# Patient Record
Sex: MALE | Race: WHITE | HISPANIC OR LATINO | Employment: UNEMPLOYED | URBAN - METROPOLITAN AREA
[De-identification: names, ages, dates, MRNs, and addresses within clinical notes are randomized per-mention and may not be internally consistent; named-entity substitution may affect disease eponyms.]

---

## 2017-05-01 ENCOUNTER — PRE VISIT (OUTPATIENT)
Dept: UROLOGY | Facility: CLINIC | Age: 77
End: 2017-05-01

## 2017-06-02 ENCOUNTER — MEDICAL CORRESPONDENCE (OUTPATIENT)
Dept: HEALTH INFORMATION MANAGEMENT | Facility: CLINIC | Age: 77
End: 2017-06-02

## 2017-06-15 ENCOUNTER — TELEPHONE (OUTPATIENT)
Dept: GASTROENTEROLOGY | Facility: OUTPATIENT CENTER | Age: 77
End: 2017-06-15

## 2017-06-21 ENCOUNTER — DOCUMENTATION ONLY (OUTPATIENT)
Dept: GASTROENTEROLOGY | Facility: OUTPATIENT CENTER | Age: 77
End: 2017-06-21

## 2017-07-06 ENCOUNTER — TELEPHONE (OUTPATIENT)
Dept: GASTROENTEROLOGY | Facility: CLINIC | Age: 77
End: 2017-07-06

## 2017-07-17 DIAGNOSIS — R39.15 URINARY URGENCY: ICD-10-CM

## 2017-07-17 DIAGNOSIS — R39.9 LOWER URINARY TRACT SYMPTOMS (LUTS): ICD-10-CM

## 2017-07-17 RX ORDER — FINASTERIDE 5 MG/1
5 TABLET, FILM COATED ORAL DAILY
Qty: 90 TABLET | Refills: 0 | Status: SHIPPED | OUTPATIENT
Start: 2017-07-17 | End: 2019-07-11 | Stop reason: ALTCHOICE

## 2017-07-28 ENCOUNTER — TELEPHONE (OUTPATIENT)
Dept: GASTROENTEROLOGY | Facility: CLINIC | Age: 77
End: 2017-07-28

## 2017-07-31 ENCOUNTER — TELEPHONE (OUTPATIENT)
Dept: GASTROENTEROLOGY | Facility: CLINIC | Age: 77
End: 2017-07-31

## 2019-03-29 ENCOUNTER — TRANSFERRED RECORDS (OUTPATIENT)
Dept: HEALTH INFORMATION MANAGEMENT | Facility: CLINIC | Age: 79
End: 2019-03-29

## 2019-04-01 ENCOUNTER — PRE VISIT (OUTPATIENT)
Dept: UROLOGY | Facility: CLINIC | Age: 79
End: 2019-04-01

## 2019-04-01 NOTE — TELEPHONE ENCOUNTER
MEDICAL RECORDS REQUEST   [facility]  Urology Clinic  [address] [place] [address]  PHONE: [phone]  Fax: [phone]        FUTURE VISIT INFORMATION                                                   Brian Mehta, : 1940 scheduled for future visit at [facility]    APPOINTMENT INFORMATION:    Date: 19 6PM     Provider:  LARRY CAMPA    Reason for Visit/Diagnosis: BURNING WITH URINATION    REFERRAL INFORMATION:    Referring provider:  SELF    Specialty: SELF    Referring providers clinic:  SELF    Clinic contact number:  SELF    RECORDS REQUESTED FOR VISIT                                                     NOTES  STATUS/DETAILS   OFFICE NOTE from referring provider  no   OFFICE NOTE from other specialist  yes   DISCHARGE SUMMARY from hospital  no   DISCHARGE REPORT from the ER  no   OPERATIVE REPORT  no   MEDICATION LIST  yes       PRE-VISIT CHECKLIST      Record collection complete Yes   Appointment appropriately scheduled           (right time/right provider) Yes   MyChart activation If no, please explain IN PROCESS   Questionnaire complete  If no, please explain IN PROCESS     Completed by: Candi Verma

## 2019-04-03 ENCOUNTER — PRE VISIT (OUTPATIENT)
Dept: UROLOGY | Facility: CLINIC | Age: 79
End: 2019-04-03

## 2019-05-29 ENCOUNTER — PRE VISIT (OUTPATIENT)
Dept: UROLOGY | Facility: CLINIC | Age: 79
End: 2019-05-29

## 2019-06-04 ENCOUNTER — OFFICE VISIT (OUTPATIENT)
Dept: UROLOGY | Facility: CLINIC | Age: 79
End: 2019-06-04
Payer: MEDICARE

## 2019-06-04 VITALS
HEIGHT: 70 IN | DIASTOLIC BLOOD PRESSURE: 91 MMHG | BODY MASS INDEX: 28.63 KG/M2 | WEIGHT: 200 LBS | SYSTOLIC BLOOD PRESSURE: 135 MMHG | HEART RATE: 100 BPM

## 2019-06-04 DIAGNOSIS — R35.0 BENIGN PROSTATIC HYPERPLASIA WITH URINARY FREQUENCY: Primary | ICD-10-CM

## 2019-06-04 DIAGNOSIS — N40.1 BENIGN PROSTATIC HYPERPLASIA WITH URINARY FREQUENCY: Primary | ICD-10-CM

## 2019-06-04 ASSESSMENT — MIFFLIN-ST. JEOR: SCORE: 1638.44

## 2019-06-04 ASSESSMENT — PAIN SCALES - GENERAL: PAINLEVEL: NO PAIN (0)

## 2019-06-04 NOTE — LETTER
"6/4/2019       RE: Brian Mehta  [address]  [facility] [address]     Dear Colleague,    Thank you for referring your patient, Brian Mehta, to the [facility] UROLOGY AND [facility] FOR PROSTATE AND UROLOGIC CANCERS at [facility]. Please see a copy of my visit note below.            Chief Complaint:   BPH with LUTS         History of Present Illness:    Brian Mehta is a very pleasant 79 year old male with a history of BPH with LUTS (urinary urgency, frequency, nocturia) who presents for evaluation of these symptoms. Per chart review, he was last seen by Dayanara Cosme PA-C in the urology clinic in 06/2016. At that time, he had been taking finasteride and tamsulosin for his symptoms, but admitted to not always taking the Flomax consistently. His last PSA, drawn 1/18/16, was 1.37.    Today, he reports that currently, he is having to urinate every hour at night, and every 2-3 hours during the day. His nocturia is the most bothersome symptom. He describes his stream as \"mediocre.\" He usually doesn't feel like he is retaining urine when he urinates. He states that he stopped taking his tamsulosin and finasteride \"a while ago,\" because he felt that they were no effective at controlling his symptoms. However, his nephew who is present today, states that he is, in fact, taking the tamsulosin, but forgets doses at times.            Past Medical History:     Past Medical History:   Diagnosis Date     Diabetes mellitus (H)             Past Surgical History:   History reviewed. No pertinent surgical history.         Medications     Current Outpatient Medications   Medication     tamsulosin (FLOMAX) 0.4 MG 24 hr capsule     UNKNOWN TO PATIENT     finasteride (PROSCAR) 5 MG tablet     METFORMIN HCL     oxybutynin (DITROPAN) 5 MG tablet     No current facility-administered medications for this visit.             Family History:   History reviewed. No pertinent family history.         " "Social History:     Social History     Socioeconomic History     Marital status:      Spouse name: Not on file     Number of children: Not on file     Years of education: Not on file     Highest education level: Not on file   Occupational History     Not on file   Social Needs     Financial resource strain: Not on file     Food insecurity:     Worry: Not on file     Inability: Not on file     Transportation needs:     Medical: Not on file     Non-medical: Not on file   Tobacco Use     Smoking status: Never Smoker     Smokeless tobacco: Never Used   Substance and Sexual Activity     Alcohol use: Not Currently     Drug use: Not on file     Sexual activity: Not Currently   Lifestyle     Physical activity:     Days per week: Not on file     Minutes per session: Not on file     Stress: Not on file   Relationships     Social connections:     Talks on phone: Not on file     Gets together: Not on file     Attends [employer] service: Not on file     Active member of club or organization: Not on file     Attends meetings of clubs or organizations: Not on file     Relationship status: Not on file     Intimate partner violence:     Fear of current or ex partner: Not on file     Emotionally abused: Not on file     Physically abused: Not on file     Forced sexual activity: Not on file   Other Topics Concern     Parent/sibling w/ CABG, MI or angioplasty before 65F 55M? Not Asked   Social History Narrative     Not on file            Allergies:   Patient has no known allergies.         Review of Systems:  From intake questionnaire   Negative 14 system review except as noted on HPI, nurse's note.         Physical Exam:   Patient is a 79 year old  male   Vitals: Blood pressure (!) 135/91, pulse 100, height 1.778 m (5' 10\"), weight 90.7 kg (200 lb).  General Appearance Adult: Alert, no acute distress, oriented  Lungs: no respiratory distress, or pursed lip breathing  Heart: No obvious jugular venous distension present  Abdomen: " soft, nontender, no organomegaly or masses, Body mass index is 28.7 kg/m .  Musculoskeltal: extremities normal, no peripheral edema  Skin: no suspicious lesions or rashes  Neuro: Alert, oriented, speech and mentation normal  : deferred     Uroflow and Post-Void Residual by Bladder Scan     PVR: 100 mL      Labs and Pathology:    I personally reviewed all applicable laboratory data and went over findings with patient  Significant for:    PSA RESULTS  PSA   Date Value Ref Range Status   01/18/2016 1.37 0 - 4 ug/L Final   11/11/2005 1.31 0 - 4 ug/L Final         Imaging:    I personally reviewed all applicable imaging            Assessment and Plan:     Assessment: 79 year old male with a history of BPH with LUTS, who has tried medication in the past, but did not feel that they were effective in controlling his symptoms. He continues to take tamsulosin, although intermittently.     Plan:  -Schedule cystoscopy with Dr. Coronado at next available to evaluate degree of outlet obstruction and candidacy for procedural intervention.       Tona Andre, CNP  Department of Urology

## 2019-06-04 NOTE — PROGRESS NOTES
"        Chief Complaint:   BPH with LUTS         History of Present Illness:    Brian Mehta is a very pleasant 79 year old male with a history of BPH with LUTS (urinary urgency, frequency, nocturia) who presents for evaluation of these symptoms. Per chart review, he was last seen by Dayanara Cosme PA-C in the urology clinic in 06/2016. At that time, he had been taking finasteride and tamsulosin for his symptoms, but admitted to not always taking the Flomax consistently. His last PSA, drawn 1/18/16, was 1.37.    Today, he reports that currently, he is having to urinate every hour at night, and every 2-3 hours during the day. His nocturia is the most bothersome symptom. He describes his stream as \"mediocre.\" He usually doesn't feel like he is retaining urine when he urinates. He states that he stopped taking his tamsulosin and finasteride \"a while ago,\" because he felt that they were no effective at controlling his symptoms. However, his nephew who is present today, states that he is, in fact, taking the tamsulosin, but forgets doses at times.            Past Medical History:     Past Medical History:   Diagnosis Date     Diabetes mellitus (H)             Past Surgical History:   History reviewed. No pertinent surgical history.         Medications     Current Outpatient Medications   Medication     tamsulosin (FLOMAX) 0.4 MG 24 hr capsule     UNKNOWN TO PATIENT     finasteride (PROSCAR) 5 MG tablet     METFORMIN HCL     oxybutynin (DITROPAN) 5 MG tablet     No current facility-administered medications for this visit.             Family History:   History reviewed. No pertinent family history.         Social History:     Social History     Socioeconomic History     Marital status:      Spouse name: Not on file     Number of children: Not on file     Years of education: Not on file     Highest education level: Not on file   Occupational History     Not on file   Social Needs     Financial resource strain: Not " "on file     Food insecurity:     Worry: Not on file     Inability: Not on file     Transportation needs:     Medical: Not on file     Non-medical: Not on file   Tobacco Use     Smoking status: Never Smoker     Smokeless tobacco: Never Used   Substance and Sexual Activity     Alcohol use: Not Currently     Drug use: Not on file     Sexual activity: Not Currently   Lifestyle     Physical activity:     Days per week: Not on file     Minutes per session: Not on file     Stress: Not on file   Relationships     Social connections:     Talks on phone: Not on file     Gets together: Not on file     Attends Pentec[employer]al service: Not on file     Active member of club or organization: Not on file     Attends meetings of clubs or organizations: Not on file     Relationship status: Not on file     Intimate partner violence:     Fear of current or ex partner: Not on file     Emotionally abused: Not on file     Physically abused: Not on file     Forced sexual activity: Not on file   Other Topics Concern     Parent/sibling w/ CABG, MI or angioplasty before 65F 55M? Not Asked   Social History Narrative     Not on file            Allergies:   Patient has no known allergies.         Review of Systems:  From intake questionnaire   Negative 14 system review except as noted on HPI, nurse's note.         Physical Exam:   Patient is a 79 year old  male   Vitals: Blood pressure (!) 135/91, pulse 100, height 1.778 m (5' 10\"), weight 90.7 kg (200 lb).  General Appearance Adult: Alert, no acute distress, oriented  Lungs: no respiratory distress, or pursed lip breathing  Heart: No obvious jugular venous distension present  Abdomen: soft, nontender, no organomegaly or masses, Body mass index is 28.7 kg/m .  Musculoskeltal: extremities normal, no peripheral edema  Skin: no suspicious lesions or rashes  Neuro: Alert, oriented, speech and mentation normal  : deferred     Uroflow and Post-Void Residual by Bladder Scan     PVR: 100 mL      Labs and " Pathology:    I personally reviewed all applicable laboratory data and went over findings with patient  Significant for:    PSA RESULTS  PSA   Date Value Ref Range Status   01/18/2016 1.37 0 - 4 ug/L Final   11/11/2005 1.31 0 - 4 ug/L Final         Imaging:    I personally reviewed all applicable imaging            Assessment and Plan:     Assessment: 79 year old male with a history of BPH with LUTS, who has tried medication in the past, but did not feel that they were effective in controlling his symptoms. He continues to take tamsulosin, although intermittently.     Plan:  -Schedule cystoscopy with Dr. Coronado at next available to evaluate degree of outlet obstruction and candidacy for procedural intervention.       Tona Andre, CNP  Department of Urology

## 2019-06-04 NOTE — NURSING NOTE
"New-Increased Frq, starting about 1 year ago.  Increased Frq, he reports 5-6 times per night associated with increased urgency.  He denies other urinary sx and pains, he is taking flowmax at night.      Chief Complaint   Patient presents with     New Patient     Increased Frq       Blood pressure (!) 135/91, pulse 100, height 1.778 m (5' 10\"), weight 90.7 kg (200 lb). Body mass index is 28.7 kg/m .    Patient Active Problem List   Diagnosis     Microscopic hematuria     Memory loss       No Known Allergies    Current Outpatient Medications   Medication Sig Dispense Refill     tamsulosin (FLOMAX) 0.4 MG 24 hr capsule Take 1 capsule (0.4 mg) by mouth daily 90 capsule 3     UNKNOWN TO PATIENT Blood pressure pills       finasteride (PROSCAR) 5 MG tablet Take 1 tablet (5 mg) by mouth daily (Patient not taking: Reported on 6/4/2019) 90 tablet 0     METFORMIN HCL        oxybutynin (DITROPAN) 5 MG tablet 5 mg take 1 tablet at nighttime (Patient not taking: Reported on 6/4/2019) 30 tablet 3       Social History     Tobacco Use     Smoking status: Never Smoker     Smokeless tobacco: Never Used   Substance Use Topics     Alcohol use: Not Currently     Drug use: None       Antony Montgomery, EMT  6/4/2019  4:55 PM        "

## 2019-06-04 NOTE — PATIENT INSTRUCTIONS
UROLOGY CLINIC VISIT PATIENT INSTRUCTIONS    1) Schedule a cystoscopy with Dr. Coronado at the next available to evaluate your prostate size, level of obstruction, and candidacy for procedural intervention.     If you have any issues, questions or concerns in the meantime, do not hesitate to contact us at [phone] or via [employer].     It was a pleasure meeting with you today.  Thank you for allowing me and my team the privilege of caring for you today.  YOU are the reason we are here, and I truly hope we provided you with the excellent service you deserve.  Please let us know if there is anything else we can do for you so that we can be sure you are leaving completely satisfied with your care experience.    Tona Andre, CNP

## 2019-06-28 ENCOUNTER — PRE VISIT (OUTPATIENT)
Dept: UROLOGY | Facility: CLINIC | Age: 79
End: 2019-06-28

## 2019-07-09 ENCOUNTER — OFFICE VISIT (OUTPATIENT)
Dept: UROLOGY | Facility: CLINIC | Age: 79
End: 2019-07-09
Payer: MEDICARE

## 2019-07-09 ENCOUNTER — ALLIED HEALTH/NURSE VISIT (OUTPATIENT)
Dept: UROLOGY | Facility: CLINIC | Age: 79
End: 2019-07-09
Payer: MEDICARE

## 2019-07-09 VITALS
HEIGHT: 70 IN | HEART RATE: 83 BPM | DIASTOLIC BLOOD PRESSURE: 86 MMHG | SYSTOLIC BLOOD PRESSURE: 161 MMHG | WEIGHT: 200 LBS | BODY MASS INDEX: 28.63 KG/M2

## 2019-07-09 DIAGNOSIS — R35.0 BENIGN PROSTATIC HYPERPLASIA WITH URINARY FREQUENCY: Primary | ICD-10-CM

## 2019-07-09 DIAGNOSIS — N40.1 BENIGN PROSTATIC HYPERPLASIA WITH URINARY FREQUENCY: Primary | ICD-10-CM

## 2019-07-09 RX ORDER — LIDOCAINE HYDROCHLORIDE 20 MG/ML
JELLY TOPICAL ONCE
Status: COMPLETED | OUTPATIENT
Start: 2019-07-09 | End: 2019-07-09

## 2019-07-09 RX ADMIN — LIDOCAINE HYDROCHLORIDE: 20 JELLY TOPICAL at 14:31

## 2019-07-09 ASSESSMENT — MIFFLIN-ST. JEOR: SCORE: 1638.44

## 2019-07-09 ASSESSMENT — PAIN SCALES - GENERAL: PAINLEVEL: NO PAIN (0)

## 2019-07-09 NOTE — PROGRESS NOTES
Pre Op Teaching Flowsheet       Pre and Post op Patient Education  Relevant Diagnosis:  BPH      Motivation Level:  Asks Questions: Yes  Eager to Learn:  Yes  Cooperative: Yes  Receptive (willing/able to accept information):  Yes  Patient demonstrates understanding of the following:  Date and time of surgery:  July25th  Location of surgery: [facility]rd [facility]  History and Physical and any other testing necessary prior to surgery: Yes, having PAC on Thursday  Required time line for completion of History and Physical and any pre-op testing: Yes    NPO Guidelines: Nothing to eat 8 hours prior to surgery. Can have clear liquids up to 2 hours prior to sugery    Patient demonstrates understanding of the following:  Pre-op bowel prep: N/A  Pre-op showering/scrub information with Hibiclens Soap: Yes  Medications to take the day of surgery:  Per PCP  Blood thinner medications discussed and when to stop (if applicable):  Yes  Diabetes medication management (if applicable):  N/A  Discussed pain control after surgery: pain scale, pain medications and pain management techniques  Infection Prevention: Patient demonstrates understanding of the following:  Patient instructed on hand hygiene:  Yes  Surgical procedure site care taught: N/A  Signs and symptoms of infection taught:  Yes  Wound care will be taught at the time of discharge.  Central venous catheter care will be taught at the time of discharge (if applicable).    Post-op follow-up:  Discussed how to contact the hospital, nurse, and clinic scheduling staff if necessary.    Instructional materials used/given/mailed:  [facility] Booklet, post op teaching sheet, Map, Soap, and arrival/location information.    Surgical instructions given to patient in clinic: Yes.    Instructional Materials given:  Before your surgery packet , Medications to avoid before surgery , Showering or Bathing instructions before surgery  and What to expect after surgery  Total  time with patient: 10 minutes    Amy Montgomery RN

## 2019-07-09 NOTE — LETTER
2019       RE: Brian Mehta  [address]  [facility] [address]     Dear Colleague,    Thank you for referring your patient, Brian Mehta, to the [facility] UROLOGY AND INST FOR PROSTATE AND UROLOGIC CANCERS at [facility]. Please see a copy of my visit note below.      Urology Clinic    Emmett Coronado MD  Date of Service: 2019     Name: Brian Mehta  MRN: [id number]  Age: 79 year old  : 1940  Referring provider: Tona Andre     Assessment and Plan:    Assessment:  Brian Mehta  is a 79 year old male with bothersome LUTS.     Plan:  After discussion of medical and surgical treatments for BPH including alpha blockers, anticholinergics, transurethral resection, laser ablation, enucleation and simple prostatectomy, Mr. Mehta has decided to proceed with Holmium Laser Enucleation of the Prostate (HoLEP), in particular the median lobe.    We discussed the associated risks of this procedure included but not limited to the following:  -Bleeding, potentially significant enough to require clot evacuation and blood transfusion  -Infection, for which we will plan to treat preoperatively based on targeted antibiotic therapy  -Damage to the bladder, urethra and penis including the risk of urethral stricture and bladder neck contracture  -Risk of incidentally discovered prostate cancer.  We discussed that this would not preclude him from further therapy though it could prolong recovery and potentially increase risk of complications associated with cancer treatment.  Further preoperative workup to assess for prostate cancer prior to surgery was offered but patient deferred.  -Risk of retrograde ejaculation which would be expected to occur in the majority if not all men after HoLEP  -Risk of urinary incontinence.  We discussed that in the majority of men this is a transient process that is generally self limited to the first 6-12 weeks after surgery  though could take longer to resolve depending on baseline bladder instability.  -Risk of postperative urinary retention though in published series HoLEP has been found to be associated with high success rates of achieving spontaneous voiding even in men with underactive and atonic bladders.  -We will proceed with preoperative clearance with preference to minimize all anticoagulation as deemed acceptable by his primary care provider.      Follow up: RTC for procedure    ---------------------------------------------------------------------------------------------------------------------    Chief Complaint:   BPH/LUTS    HPI:   Brian Mehta  is a 79 year old male with a history of BPH with LUTS (urinary urgency, frequency, nocturia) who presents for cystoscopy to evaluate the degree of outlet obstruction. He has had bother from obstructive and irritative LUTS for several years.  Has been evaluated in the office previously and has been on and off flomax and finasteride.  Here today because he is quite botherd, main concern is nocturia up to 5-6 times per evening.  Also notes slow, intermittent stream.  Is potentially interested in BPH surgical treatment.    Review of Systems:   Pertinent items are noted in HPI or as below, remainder of complete ROS is negative.      Active Medications:     Current Outpatient Medications:      finasteride (PROSCAR) 5 MG tablet, Take 1 tablet (5 mg) by mouth daily (Patient not taking: Reported on 6/4/2019), Disp: 90 tablet, Rfl: 0     METFORMIN HCL, , Disp: , Rfl:      oxybutynin (DITROPAN) 5 MG tablet, 5 mg take 1 tablet at nighttime (Patient not taking: Reported on 6/4/2019), Disp: 30 tablet, Rfl: 3     tamsulosin (FLOMAX) 0.4 MG 24 hr capsule, Take 1 capsule (0.4 mg) by mouth daily, Disp: 90 capsule, Rfl: 3     UNKNOWN TO PATIENT, Blood pressure pills, Disp: , Rfl:       Allergies:   No allergies      Past Medical History:  Diabetes   Microscopic hematuria   Memory loss     Past  Surgical History:  No pertinent past surgical history     Family History:   No pertinent family history       Social History:   The patient was accompanied to the appointment by: his son .  Smoking Status: never   Smokeless Tobacco: never    Alcohol Use: not currently       Physical Exam:   There were no vitals taken for this visit.   There is no height or weight on file to calculate BMI.  General: Alert, no acute distress, oriented  HENT: Good dentition  Lungs: No respiratory distress, or pursed lip breathing  Heart: No obvious jugular venous distension present  Abdomen: Soft, nontender, no organomegaly or masses  Musculoskeletal: Extremities normal, no peripheral edema  Skin: No suspicious lesions or rashes  Neuro: Alert, oriented, speech and mentation normal  Psych: Affect and mood normal  Gait: Normal  : 40 gm smooth    CYSTOSCOPY  After obtaining informed consent, the patient was prepped and draped in the standard sterile fashion.  The 15 [place] flexible cystoscope was inserted through the urethral meatus.      The anterior urethra was:  normal without stricture.    The external sphincter was  appropriately coapted.   The prostatic urethra demonstrated mild bilobar hypertrophy.    The bladder neck was occluded by a median lob   The bladder was  unremarkable for tumors, erythema or stones.    The ureteral orifices  were identified on each side in orthotopic position with efflux of clear urine.   There were moderate trabeculations.    On retroflexion there was the usual bladder neck hyperemia.    There was a BALL VALVING intravesical protrusion of the median lobe of the prostate.      The patient tolerated the procedure well without complication.      Imaging:   I have personally reviewed the results of the below imaging studies. The results were discussed with the patient.       Laboratory:   I personally reviewed all applicable laboratory data and went over findings with patient  Significant for:    PSA  RESULTS:   PSA   Date Value Ref Range Status   01/18/2016 1.37 0 - 4 ug/L Final   11/11/2005 1.31 0 - 4 ug/L Final     Scribe Disclosure:  IVirginia, am serving as a scribe to document services personally performed by Emmett Coronado MD at this visit, based upon the provider's statements to me. All documentation has been reviewed by the aforementioned provider prior to being entered into the official medical record.     IVirginia, a scribe, prepared the chart for today's encounter.       Again, thank you for allowing me to participate in the care of your patient.      Sincerely,    Emmett Coronado MD

## 2019-07-09 NOTE — PROGRESS NOTES
Urology Clinic    Emmett Coronado MD  Date of Service: 2019     Name: Brian Mehta  MRN: [id number]  Age: 79 year old  : 1940  Referring provider: Tona Andre     Assessment and Plan:    Assessment:  Brian Mehta  is a 79 year old male with bothersome LUTS.     Plan:  After discussion of medical and surgical treatments for BPH including alpha blockers, anticholinergics, transurethral resection, laser ablation, enucleation and simple prostatectomy, Mr. Mehta has decided to proceed with Holmium Laser Enucleation of the Prostate (HoLEP), in particular the median lobe.    We discussed the associated risks of this procedure included but not limited to the following:  -Bleeding, potentially significant enough to require clot evacuation and blood transfusion  -Infection, for which we will plan to treat preoperatively based on targeted antibiotic therapy  -Damage to the bladder, urethra and penis including the risk of urethral stricture and bladder neck contracture  -Risk of incidentally discovered prostate cancer.  We discussed that this would not preclude him from further therapy though it could prolong recovery and potentially increase risk of complications associated with cancer treatment.  Further preoperative workup to assess for prostate cancer prior to surgery was offered but patient deferred.  -Risk of retrograde ejaculation which would be expected to occur in the majority if not all men after HoLEP  -Risk of urinary incontinence.  We discussed that in the majority of men this is a transient process that is generally self limited to the first 6-12 weeks after surgery though could take longer to resolve depending on baseline bladder instability.  -Risk of postperative urinary retention though in published series HoLEP has been found to be associated with high success rates of achieving spontaneous voiding even in men with underactive and atonic bladders.  -We will proceed with  preoperative clearance with preference to minimize all anticoagulation as deemed acceptable by his primary care provider.      Follow up: RTC for procedure    ---------------------------------------------------------------------------------------------------------------------    Chief Complaint:   BPH/LUTS    HPI:   Brian Mehta  is a 79 year old male with a history of BPH with LUTS (urinary urgency, frequency, nocturia) who presents for cystoscopy to evaluate the degree of outlet obstruction. He has had bother from obstructive and irritative LUTS for several years.  Has been evaluated in the office previously and has been on and off flomax and finasteride.  Here today because he is quite botherd, main concern is nocturia up to 5-6 times per evening.  Also notes slow, intermittent stream.  Is potentially interested in BPH surgical treatment.    Review of Systems:   Pertinent items are noted in HPI or as below, remainder of complete ROS is negative.      Active Medications:     Current Outpatient Medications:      finasteride (PROSCAR) 5 MG tablet, Take 1 tablet (5 mg) by mouth daily (Patient not taking: Reported on 6/4/2019), Disp: 90 tablet, Rfl: 0     METFORMIN HCL, , Disp: , Rfl:      oxybutynin (DITROPAN) 5 MG tablet, 5 mg take 1 tablet at nighttime (Patient not taking: Reported on 6/4/2019), Disp: 30 tablet, Rfl: 3     tamsulosin (FLOMAX) 0.4 MG 24 hr capsule, Take 1 capsule (0.4 mg) by mouth daily, Disp: 90 capsule, Rfl: 3     UNKNOWN TO PATIENT, Blood pressure pills, Disp: , Rfl:       Allergies:   No allergies      Past Medical History:  Diabetes   Microscopic hematuria   Memory loss     Past Surgical History:  No pertinent past surgical history     Family History:   No pertinent family history       Social History:   The patient was accompanied to the appointment by: his son .  Smoking Status: never   Smokeless Tobacco: never    Alcohol Use: not currently       Physical Exam:   There were no vitals taken  for this visit.   There is no height or weight on file to calculate BMI.  General: Alert, no acute distress, oriented  HENT: Good dentition  Lungs: No respiratory distress, or pursed lip breathing  Heart: No obvious jugular venous distension present  Abdomen: Soft, nontender, no organomegaly or masses  Musculoskeletal: Extremities normal, no peripheral edema  Skin: No suspicious lesions or rashes  Neuro: Alert, oriented, speech and mentation normal  Psych: Affect and mood normal  Gait: Normal  : 40 gm smooth    CYSTOSCOPY  After obtaining informed consent, the patient was prepped and draped in the standard sterile fashion.  The 15 Czech flexible cystoscope was inserted through the urethral meatus.      The anterior urethra was:  normal without stricture.    The external sphincter was  appropriately coapted.   The prostatic urethra demonstrated mild bilobar hypertrophy.    The bladder neck was occluded by a median lob   The bladder was  unremarkable for tumors, erythema or stones.    The ureteral orifices  were identified on each side in orthotopic position with efflux of clear urine.   There were moderate trabeculations.    On retroflexion there was the usual bladder neck hyperemia.    There was a BALL VALVING intravesical protrusion of the median lobe of the prostate.      The patient tolerated the procedure well without complication.      Imaging:   I have personally reviewed the results of the below imaging studies. The results were discussed with the patient.       Laboratory:   I personally reviewed all applicable laboratory data and went over findings with patient  Significant for:    PSA RESULTS:   PSA   Date Value Ref Range Status   01/18/2016 1.37 0 - 4 ug/L Final   11/11/2005 1.31 0 - 4 ug/L Final         Scribe Disclosure:  I, Virginia Nova, am serving as a scribe to document services personally performed by Emmett Coronado MD at this visit, based upon the provider's statements to me. All  documentation has been reviewed by the aforementioned provider prior to being entered into the official medical record.     IVirginia, a scribe, prepared the chart for today's encounter.

## 2019-07-09 NOTE — NURSING NOTE
"Return-Cysto    Hx of BPH and nocturia ever 1-2 hours.    He denies other urinary sx and pains.    Chief Complaint   Patient presents with     Cystoscopy     Nocturia and BPH       Blood pressure 161/86, pulse 83, height 1.778 m (5' 10\"), weight 90.7 kg (200 lb). Body mass index is 28.7 kg/m .    Patient Active Problem List   Diagnosis     Microscopic hematuria     Memory loss       No Known Allergies    Current Outpatient Medications   Medication Sig Dispense Refill     METFORMIN HCL        tamsulosin (FLOMAX) 0.4 MG 24 hr capsule Take 1 capsule (0.4 mg) by mouth daily 90 capsule 3     UNKNOWN TO PATIENT Blood pressure pills       finasteride (PROSCAR) 5 MG tablet Take 1 tablet (5 mg) by mouth daily (Patient not taking: Reported on 2019) 90 tablet 0     oxybutynin (DITROPAN) 5 MG tablet 5 mg take 1 tablet at nighttime (Patient not taking: Reported on 2019) 30 tablet 3       Social History     Tobacco Use     Smoking status: Never Smoker     Smokeless tobacco: Never Used   Substance Use Topics     Alcohol use: Not Currently     Drug use: Never       Invasive Procedure Safety Checklist:    Procedure: Cystoscopy    Action: Complete sections and checkboxes as appropriate.    Pre-procedure:  1. Patient ID Verified with 2 identifiers (Lynda and  or MRN) : YES    2. Procedure and site verified with patient/designee (when able) : YES    3. Accurate consent documentation in medical record : YES    4. H&P (or appropriate assessment) documented in medical record : N/A  H&P must be up to 30 days prior to procedure an updated within 24 hours of                 Procedure as applicable.     5. Relevant diagnostic and radiology test results appropriately labeled and displayed as applicable : YES    6. Blood products, implants, devices, and/or special equipment available for the procedure as applicable : YES    7. Procedure site(s) marked with provider initials [Exclusions: none] : NO    8. Marking not required. Reason : " Yes  Procedure does not require site marking    Time Out:     Time-Out performed immediately prior to starting procedure, including verbal and active participation of all team members addressing: YES    1. Correct patient identity.  2. Confirmed that the correct side and site are marked.  3. An accurate procedure to be done.  4. Agreement on the procedure to be done.  5. Correct patient position.  6. Relevant images and results are properly labeled and appropriately displayed.  7. The need to administer antibiotics or fluids for irrigation purposes during the procedure as applicable.  8. Safety precautions based on patient history or medication use.    During Procedure: Verification of correct person, site, and procedure occurs any time the responsibility for care of the patient is transferred to another member of the care team.    The following medication was given:     MEDICATION: Lidocaine Uro-Jet 2% 200mg (20mg/mL)  ROUTE: Urethral   SITE: Urethra   DOSE: 10mL  LOT #: [id number]  : [employer]   EXPIRATION DATE: 3-21  NDC#: [address]-11   Was there drug waste? No    Prior to injection, verified patient identity using patient's name and date of birth.  Due to injection administration, patient instructed to remain in clinic for 15 minutes  afterwards, and to report any adverse reaction to me immediately.    Drug Amount Wasted:  None.  Vial/Syringe: Single dose vial      Antony Montgomery, EMT  7/9/2019  2:31 PM

## 2019-07-10 ENCOUNTER — PRE VISIT (OUTPATIENT)
Dept: SURGERY | Facility: CLINIC | Age: 79
End: 2019-07-10

## 2019-07-10 NOTE — TELEPHONE ENCOUNTER
FUTURE VISIT INFORMATION      SURGERY INFORMATION:    Date: 19    Location: [facility] OR    Surgeon:  Emmett Coronado    Anesthesia Type:  General    RECORDS REQUESTED FROM:       Primary Care Provider: [facility]- requested records/testing    Most recent EKG+ [id number]

## 2019-07-11 ENCOUNTER — ANESTHESIA EVENT (OUTPATIENT)
Dept: SURGERY | Facility: CLINIC | Age: 79
End: 2019-07-11
Payer: MEDICARE

## 2019-07-11 ENCOUNTER — OFFICE VISIT (OUTPATIENT)
Dept: SURGERY | Facility: CLINIC | Age: 79
End: 2019-07-11
Payer: MEDICARE

## 2019-07-11 VITALS
OXYGEN SATURATION: 97 % | RESPIRATION RATE: 16 BRPM | TEMPERATURE: 98.1 F | SYSTOLIC BLOOD PRESSURE: 146 MMHG | WEIGHT: 206 LBS | BODY MASS INDEX: 30.51 KG/M2 | HEART RATE: 81 BPM | HEIGHT: 69 IN | DIASTOLIC BLOOD PRESSURE: 84 MMHG

## 2019-07-11 DIAGNOSIS — N40.1 BENIGN PROSTATIC HYPERPLASIA WITH LOWER URINARY TRACT SYMPTOMS, SYMPTOM DETAILS UNSPECIFIED: ICD-10-CM

## 2019-07-11 DIAGNOSIS — Z01.818 PRE-OPERATIVE GENERAL PHYSICAL EXAMINATION: ICD-10-CM

## 2019-07-11 DIAGNOSIS — Z01.818 PRE-OPERATIVE GENERAL PHYSICAL EXAMINATION: Primary | ICD-10-CM

## 2019-07-11 LAB
ALBUMIN UR-MCNC: NEGATIVE MG/DL
ANION GAP SERPL CALCULATED.3IONS-SCNC: 3 MMOL/L (ref 3–14)
APPEARANCE UR: ABNORMAL
BILIRUB UR QL STRIP: NEGATIVE
BUN SERPL-MCNC: 20 MG/DL (ref 7–30)
CALCIUM SERPL-MCNC: 8.4 MG/DL (ref 8.5–10.1)
CHLORIDE SERPL-SCNC: 104 MMOL/L (ref 94–109)
CO2 SERPL-SCNC: 31 MMOL/L (ref 20–32)
COLOR UR AUTO: YELLOW
CREAT SERPL-MCNC: 1.07 MG/DL (ref 0.66–1.25)
ERYTHROCYTE [DISTWIDTH] IN BLOOD BY AUTOMATED COUNT: 13.6 % (ref 10–15)
GFR SERPL CREATININE-BSD FRML MDRD: 66 ML/MIN/{1.73_M2}
GLUCOSE SERPL-MCNC: 141 MG/DL (ref 70–99)
GLUCOSE UR STRIP-MCNC: NEGATIVE MG/DL
HBA1C MFR BLD: 7.1 % (ref 0–5.6)
HCT VFR BLD AUTO: 46.1 % (ref 40–53)
HGB BLD-MCNC: 14.4 G/DL (ref 13.3–17.7)
HGB UR QL STRIP: NEGATIVE
KETONES UR STRIP-MCNC: NEGATIVE MG/DL
LEUKOCYTE ESTERASE UR QL STRIP: ABNORMAL
MCH RBC QN AUTO: 27.6 PG (ref 26.5–33)
MCHC RBC AUTO-ENTMCNC: 31.2 G/DL (ref 31.5–36.5)
MCV RBC AUTO: 88 FL (ref 78–100)
MUCOUS THREADS #/AREA URNS LPF: PRESENT /LPF
NITRATE UR QL: NEGATIVE
PH UR STRIP: 5 PH (ref 5–7)
PLATELET # BLD AUTO: 193 10E9/L (ref 150–450)
POTASSIUM SERPL-SCNC: 4 MMOL/L (ref 3.4–5.3)
RBC # BLD AUTO: 5.22 10E12/L (ref 4.4–5.9)
RBC #/AREA URNS AUTO: 3 /HPF (ref 0–2)
SODIUM SERPL-SCNC: 138 MMOL/L (ref 133–144)
SOURCE: ABNORMAL
SP GR UR STRIP: 1.02 (ref 1–1.03)
UROBILINOGEN UR STRIP-MCNC: 0 MG/DL (ref 0–2)
WBC # BLD AUTO: 6.3 10E9/L (ref 4–11)
WBC #/AREA URNS AUTO: 19 /HPF (ref 0–5)

## 2019-07-11 PROCEDURE — 87086 URINE CULTURE/COLONY COUNT: CPT | Performed by: PHYSICIAN ASSISTANT

## 2019-07-11 RX ORDER — GABAPENTIN 100 MG/1
100 CAPSULE ORAL DAILY
Refills: 0 | COMMUNITY
Start: 2019-03-29

## 2019-07-11 RX ORDER — MULTIVITAMIN
1 TABLET ORAL EVERY MORNING
Refills: 3 | COMMUNITY
Start: 2019-05-23

## 2019-07-11 RX ORDER — ASPIRIN 81 MG/1
1 TABLET ORAL EVERY MORNING
COMMUNITY
Start: 2016-09-10

## 2019-07-11 ASSESSMENT — PAIN SCALES - GENERAL: PAINLEVEL: NO PAIN (0)

## 2019-07-11 ASSESSMENT — MIFFLIN-ST. JEOR: SCORE: 1649.79

## 2019-07-11 NOTE — ANESTHESIA PREPROCEDURE EVALUATION
Anesthesia Pre-Procedure Evaluation    Patient: Brian Mehta   MRN:     [id number] Gender:   male   Age:    79 year old :      1940        Preoperative Diagnosis: Bladder Outlet Obstruction   Procedure(s):  Holmium Laser Enucleation Of The Prostate (Median Lobe)     Past Medical History:   Diagnosis Date     Diabetes mellitus (H)       Past Surgical History:   Procedure Laterality Date     APPENDECTOMY            Anesthesia Evaluation     . Pt has had prior anesthetic. Type: General           ROS/MED HX    ENT/Pulmonary:     (+)RODNEY risk factors , . .    Neurologic:  - neg neurologic ROS   (+)other neuro memory loss    Cardiovascular:     (+) hypertension----. Taking blood thinners Pt has not received instructions: . . . :. . Previous cardiac testing date:results:date: results:ECG reviewed date:2019 results:Sinus rhythm with 1st degree AV block date: results:          METS/Exercise Tolerance:  1 - Eating, dressing   Hematologic:  - neg hematologic  ROS       Musculoskeletal:   (+)  other musculoskeletal- off balance so needs walker      GI/Hepatic:  - neg GI/hepatic ROS       Renal/Genitourinary:  - ROS Renal section negative       Endo:     (+) type II DM Last HgA1c: 7.1 date: 2019 Not using insulin - not using insulin pump Normal glucose range: doesn't check not previously admitted for DM/DKA Obesity, .      Psychiatric:         Infectious Disease:  - neg infectious disease ROS       Malignancy:      - no malignancy   Other:    - neg other ROS                     PHYSICAL EXAM:   Mental Status/Neuro: A/A/O   Airway: Facies: Feasible  Mallampati: III  Mouth/Opening: Limited  TM distance: > 6 cm  Neck ROM: Limited   Respiratory: Auscultation: CTAB     Resp. Rate: Normal     Resp. Effort: Normal      CV: Rhythm: Regular  Rate: Age appropriate  Heart: Normal Sounds  CV Other: radial pulse 2 + and equal   Comments:      Dental:  Dentures: Upper  Dental Comments: Missing upper and lower posterior  "teeth                Lab Results   Component Value Date     02/15/2006    POTASSIUM 4.1 02/15/2006    CHLORIDE 102 02/15/2006    CO2 28 02/15/2006    BUN 16 02/15/2006    CR 1.14 02/15/2006    GLC 97 02/15/2006    JULIOCESAR 9.1 02/15/2006    ALBUMIN 3.9 11/11/2005    PROTTOTAL 7.6 11/11/2005    ALT 30 11/11/2005    AST 48 11/11/2005    ALKPHOS 56 11/11/2005    BILITOTAL 1.1 11/11/2005       Preop Vitals  BP Readings from Last 3 Encounters:   07/11/19 146/84   07/09/19 161/86   06/04/19 (!) 135/91    Pulse Readings from Last 3 Encounters:   07/11/19 81   07/09/19 83   06/04/19 100      Resp Readings from Last 3 Encounters:   07/11/19 16    SpO2 Readings from Last 3 Encounters:   07/11/19 97%      Temp Readings from Last 1 Encounters:   07/11/19 98.1  F (36.7  C) (Oral)    Ht Readings from Last 1 Encounters:   07/11/19 1.753 m (5' 9\")      Wt Readings from Last 1 Encounters:   07/11/19 93.4 kg (206 lb)    Estimated body mass index is 30.42 kg/m  as calculated from the following:    Height as of this encounter: 1.753 m (5' 9\").    Weight as of this encounter: 93.4 kg (206 lb).     LDA:            Assessment:   ASA SCORE: 2    H&P: History and physical reviewed and following examination; no interval change.   Smoking Status:  Non-Smoker/Unknown   NPO Status: NPO Appropriate     Plan:   Anes. Type:  General   Pre-Medication: None   Induction:  IV (Standard)   Airway: LMA   Access/Monitoring: PIV   Maintenance: Balanced     Postop Plan:   Postop Pain: Opioids  Postop Sedation/Airway: Not planned  Disposition: Outpatient     PONV Management:   Adult Risk Factors:, Non-Smoker, Postop Opioids   Prevention: Ondansetron     CONSENT: Direct conversation   Plan and risks discussed with: Other (nephew who cares for him)   Blood Products: Consented (ALL Blood Products)                  PAC Discussion and Assessment    ASA Classification: 2  Case is suitable for:   Anesthetic techniques and relevant risks discussed: GA  Invasive " monitoring and risk discussed: No  Types:   Possibility and Risk of blood transfusion discussed: No  NPO instructions given:   Additional anesthetic preparation and risks discussed:   Needs early admission to pre-op area:   Other:     PAC Resident/NP Anesthesia Assessment:  Brian Mehta is a 79 year old male for Holmium laser enucleation of prostate, on 7/25/19, with Dr. Emmett Coronado, in diagnosis and treatment of bladder outlet obstruction with Lower urinary tract sx, most bothersome of which is nocturia 5 x night. PAC referral by Dr. Bowden for risk assessment and optimization for anesthesia with comorbid conditions of HTN, diabetes, as well as diagnosis of Alzheimer's ds.   Pre-operative considerations include:  1.) CV: Functional status - Grandson answers most questions. Pt lives with a friend and has home health aid. Will sometimes need help dressing/washing.  Pt uses walker for ambulation due to sense of imbalance. He denies PANIAGUA or CP/Jaw/arm pain. Exercise tolerance < 4 METS. Cardiac risk of HTN (Lisinopril) and diabetes (diet controlled). Never smoked.  EKG today read with Dr. Hong - sinus rhythm with 1st degree block.  RCRI =0 reflecting 0.4% risk of major adverse cardiac event  No further cardiac evaluation indicated for this low risk procedure.  BMP today. Hold ACEI DOS  2.) Pulmonary: No pulmonary dx, sx or medications  RODNEY risk is 4.8 = intermediate risk / no formal testing and no interest in testing.  3.) Heme: No bleeding or clotting dysfunction. No recent HGB.   CBC today  4.) GI: PONV score = 1 (2 or > antiemetic prophylaxis recommended)  5.) Endo: Type 2 diabetes - pt and grandson say well controlled but no daily testing or recent HGBA1C (Jan 2018 value was 6.7%)  HGBA1C today    Anesthesia: Last GA was at age 16 for appendectomy. He has no knowledge of anesthesia problems with family members  Pt discussed with and seen by Dr. Monreal      Reviewed and Signed by PAC Mid-Level  Provider/Resident  Mid-Level Provider/Resident: Vicki Medina PA-C  Date: 7/11/19  Time: 3:44PM    Attending Anesthesiologist Anesthesia Assessment:        Anesthesiologist:   Date:   Time:   Pass/Fail:   Disposition:     PAC Pharmacist Assessment:        Pharmacist:   Date:   Time:        TIANNA Roldan

## 2019-07-11 NOTE — PATIENT INSTRUCTIONS
Preparing for Your Surgery      Name:  Brian Mehta   MRN:  [id number]   :  1940   Today's Date:  2019     Arriving for surgery:  Surgery date:  19   Arrival time:  2:20PM  Please come to:     [facility] [address]  [address] [place]  [address]    - parking is available in front of [facility] from 5:15AM to 8:00PM. If you prefer, park your car in the Green Lot.    -Proceed to the 3rd floor, check in at the Adult Surgery Waiting Lounge. [phone]    If an escort is needed stop at the Information Desk in the lobby. Inform the information person that you are here for surgery. An escort to the Adult Surgery Waiting Lounge will be provided.        What can I eat or drink?  -  You may have solid food or milk products until 8 hours prior to your surgery. 19, 8:20AM  -  You may have water, apple juice or 7up/Sprite until 2 hours prior to your surgery. 19, 2:20PM    Which medicines can I take?  Stop Aspirin, Multivitamins and supplements one week prior to surgery.  Hold Ibuprofen for 24 hours and/or Naproxen for 48 hours prior to surgery.   -  Do NOT take these medications in the morning, the day of surgery:    Metformin(Glucophage)      -  Please take these medications the day of surgery:    Gabapentin(Neurontin)    How do I prepare myself?  -  Take two showers: one the night before surgery; and one the morning of surgery.         Use Scrubcare or Hibiclens to wash from neck down.  You may use your own shampoo and conditioner. No other hair products.   -  Do NOT use lotion, powder, deodorant, or antiperspirant the day of your surgery.  -  Do NOT wear jewelry.  - Do not bring your own medications to the hospital, except for inhalers and eye drops.  -  Bring your ID and insurance card.    -If you are scheduled to go home the Same Day as surgery you must have a responsible adult as a  and to stay with you overnight the first 24  hours after surgery.     Questions or Concerns:  -If you have questions or concerns regarding the day of surgery, please call [phone].     -For questions after surgery please call your surgeons office.

## 2019-07-11 NOTE — H&P
Pre-Operative H & P     CC:  Preoperative exam to assess for increased cardiopulmonary risk while undergoing surgery and anesthesia.    Date of Encounter: 7/11/2019  Primary Care Physician:  No primary care provider on file.    HPI  Brian Mehta is a 79 year old male who presents for pre-operative H & P in preparation for Holmium laser enucleation of prostate, on 7/25/19, with Dr. Emmett Coronado, [facility], in diagnosis and treatment of bladder outlet obstruction. He has Lower urinary tract sx, most bothersome of which is nocturia 5 x night. He also has urgency and frequency and has tried flomax and finasteride.   He has co morbidities of HTN and type 2 diabetes. He has not had a surgery since age 16.    History is obtained from the patient and his grandson    Past Medical History  Past Medical History:   Diagnosis Date     Diabetes mellitus (H)        Past Surgical History  Past Surgical History:   Procedure Laterality Date     APPENDECTOMY         Hx of Blood transfusions/reactions: no     Hx of abnormal bleeding or anti-platelet use: yes on ASA    Menstrual history: No LMP for male patient.    Steroid use in the last year: no    Personal or FH with difficulty with Anesthesia:  No but last experience was age 16    Prior to Admission Medications  Current Outpatient Medications   Medication Sig Dispense Refill     aspirin 81 MG EC tablet Take 1 tablet by mouth every morning        gabapentin (NEURONTIN) 100 MG capsule Take 100 mg by mouth daily   0     metFORMIN (GLUCOPHAGE) 500 MG tablet Take 500 mg by mouth every morning   5     multivitamin (ONE-DAILY) tablet Take 1 tablet by mouth every morning   3     tamsulosin (FLOMAX) 0.4 MG 24 hr capsule Take 1 capsule (0.4 mg) by mouth daily (Patient taking differently: Take 0.4 mg by mouth At Bedtime ) 90 capsule 3       Allergies  Allergies   Allergen Reactions     Pork Allergy        Social History  Social History  "    Socioeconomic History     Marital status:      Tobacco Use     Smoking status: Never Smoker     Smokeless tobacco: Never Used   Substance and Sexual Activity     Alcohol use: Not Currently     Drug use: Never     Sexual activity: Yes     Partners: Female     Family History  No family history of early heart disease      Anesthesia Evaluation  Pt has had prior anesthetic. Type: General     ROS/MED HX    ENT/Pulmonary:     (+)RODNEY risk factors , . .    Neurologic:  - neg neurologic ROS   (+)other neuro memory loss    Cardiovascular:     (+) hypertension----. Taking blood thinners Pt has not received instructions: . . . :. . Previous cardiac testing date:results:date: results:ECG reviewed date:2014 results: date: results:          METS/Exercise Tolerance:  1 - Eating, dressing   Hematologic:  - neg hematologic  ROS       Musculoskeletal:   (+)  other musculoskeletal- off balance so needs walker      GI/Hepatic:  - neg GI/hepatic ROS       Renal/Genitourinary:  - ROS Renal section negative       Endo:     (+) type II DM Last HgA1c: 6.7% date: 1/2018 Not using insulin - not using insulin pump Normal glucose range: doesn't check not previously admitted for DM/DKA Obesity, .      Psychiatric:         Infectious Disease:  - neg infectious disease ROS       Malignancy:      - no malignancy   Other:    (+) No chance of pregnancy no H/O Chronic Pain,no other significant disability          The complete review of systems is negative other than noted in the HPI or here.   Temp: 98.1  F (36.7  C) Temp src: Oral BP: 146/84 Pulse: 81   Resp: 16 SpO2: 97 %         206 lbs 0 oz  5' 9\"   Body mass index is 30.42 kg/m .       Physical Exam  Constitutional: Awake, alert, cooperative, no apparent distress, and appears stated age. Disheveled appearance  Eyes: Pupils equal, round and reactive to light, extra ocular muscles intact, sclera clear, conjunctiva normal.  HENT: Normocephalic, oral pharynx with moist mucus membranes, " upper partial denture. No goiter appreciated.   Respiratory: Clear to auscultation bilaterally, no crackles or wheezing.  Cardiovascular: Regular rate and rhythm, normal S1 and S2, distant sound out at apex. no murmur noted.  Carotids +2, no bruits. No LE edema. Palpable pulses to radial arteries.   GI: Normal bowel sounds, soft, non-distended, non-tender, no masses palpated, no hepatosplenomegaly.  Surgical scars: large central well healed abdominal scar.  Lymph/Hematologic: No cervical lymphadenopathy and no supraclavicular lymphadenopathy.  Genitourinary:  deferred  Skin: Warm and dry.    Musculoskeletal: Decreased extension ROM of neck. There is no redness, warmth, or swelling of the joints. Gross motor strength BUE 4/5   Neurologic: Awake, alert. Oriented to name. Friendly demeanor, short answers - looks to his grandson for help. Cranial nerves II-XII are grossly intact. Gait is normal.   Neuropsychiatric: Calm, cooperative. Normal affect.     Labs: (personally reviewed)  Lab Results   Component Value Date    WBC 6.3 07/11/2019     Lab Results   Component Value Date    RBC 5.22 07/11/2019     Lab Results   Component Value Date    HGB 14.4 07/11/2019     Lab Results   Component Value Date    HCT 46.1 07/11/2019     Lab Results   Component Value Date    MCV 88 07/11/2019     Lab Results   Component Value Date    MCH 27.6 07/11/2019     Lab Results   Component Value Date    MCHC 31.2 07/11/2019     Lab Results   Component Value Date    RDW 13.6 07/11/2019     Lab Results   Component Value Date     07/11/2019     Last Comprehensive Metabolic Panel:  Sodium   Date Value Ref Range Status   07/11/2019 138 133 - 144 mmol/L Final     Potassium   Date Value Ref Range Status   07/11/2019 4.0 3.4 - 5.3 mmol/L Final     Chloride   Date Value Ref Range Status   07/11/2019 104 94 - 109 mmol/L Final     Carbon Dioxide   Date Value Ref Range Status   07/11/2019 31 20 - 32 mmol/L Final     Anion Gap   Date Value Ref Range  Status   07/11/2019 3 3 - 14 mmol/L Final     Glucose   Date Value Ref Range Status   07/11/2019 141 (H) 70 - 99 mg/dL Final     Urea Nitrogen   Date Value Ref Range Status   07/11/2019 20 7 - 30 mg/dL Final     Creatinine   Date Value Ref Range Status   07/11/2019 1.07 0.66 - 1.25 mg/dL Final     GFR Estimate   Date Value Ref Range Status   07/11/2019 66 >60 mL/min/[1.73_m2] Final     Comment:     Non  GFR Calc  Starting 12/18/2018, serum creatinine based estimated GFR (eGFR) will be   calculated using the Chronic Kidney Disease Epidemiology Collaboration   (CKD-EPI) equation.       Calcium   Date Value Ref Range Status   07/11/2019 8.4 (L) 8.5 - 10.1 mg/dL Final       EKG: today NSR    Outside records reviewed from: [facility]    ASSESSMENT and PLAN  Brian Mehta is a 79 year old male scheduled to undergo Holmium laser enucleation of prostate, on 7/25/19, with Dr. Emmett Coronado, in diagnosis and treatment of bladder outlet obstruction with Lower urinary tract sx, most bothersome of which is nocturia 5 x night.      Pre-operative considerations include:  1.) CV: Functional status - Grandson answers most questions. Pt lives with a friend and has home health aid. Will sometimes need help dressing/washing.  Pt uses walker for ambulation due to sense of imbalance. He denies PANIAGUA or CP/Jaw/arm pain. Exercise tolerance < 4 METS. Cardiac risk of HTN (Lisinopril) and diabetes (diet controlled). Never smoked.  EKG today read with Dr. Hong - sinus rhythm with 1st degree block.  RCRI =0 reflecting 0.4% risk of major adverse cardiac event  No further cardiac evaluation indicated for this low risk procedure.  BMP today. Hold ACEI DOS    2.) Pulmonary: No pulmonary dx, sx or medications  RODNEY risk is 4.8 = intermediate risk / no formal testing and no interest in testing.    3.) Heme: No bleeding or clotting dysfunction. No recent HGB.   CBC today    4.) GI: PONV score = 1 (2 or > antiemetic prophylaxis  recommended)    5.) Endo: Type 2 diabetes - pt and grandson say well controlled but no daily testing or recent HGBA1C (Jan 2018 value was 6.7%).   HGBA1C today.    6.) Neuro: Chart diagnosis of Alzheimer's disease manifested mostly by memory loss. Evaluating patient answers is challenging as grandson quite vocal with answers quickly, although when I asked patient to answer directly, those answers are short and he looks to his grandson to answer.  I believe both of them underestimate any chronic illness and/or need for meds.      - Anesthesia considerations:  Refer to PAC assessment in anesthesia records      Patient was discussed with Dr Monreal.    TIANNA Roldan  Preoperative Assessment Center  [facility]  [facility]  Phone: [phone]  Fax: [phone]

## 2019-07-12 LAB
BACTERIA SPEC CULT: NORMAL
Lab: NORMAL
SPECIMEN SOURCE: NORMAL

## 2019-07-15 ENCOUNTER — TELEPHONE (OUTPATIENT)
Dept: UROLOGY | Facility: CLINIC | Age: 79
End: 2019-07-15

## 2019-07-15 NOTE — TELEPHONE ENCOUNTER
[facility] Call Center    Phone Message    May a detailed message be left on voicemail: yes    Reason for Call: Other: Pt son Yung would like a call ASAP to discuss the Pt surgery.  Yung is considering getting a second opinion.  Please have staff call him ASAP     Action Taken: Message routed to:  [facility] ([facility]): urology

## 2019-07-16 NOTE — TELEPHONE ENCOUNTER
I spoke with patient this morning and he really wants to do the Rezum procedure instead for the Holep. Son is asking for a call from Dr. Coronado to discuss.

## 2019-07-16 NOTE — TELEPHONE ENCOUNTER
[facility] Call Center    Phone Message    May a detailed message be left on voicemail: yes    Reason for Call: Other: pt's son, Yung, called the nurse back, but nursing staff not availble to take Ynug's call. Yung wants the Care Team of Dr. Coronado to call him back to re-think the laser surgery that is scheduled on 7/25/19. Family did some research and pt is concerned about the repercussions from the laser surgery. There is a procedure using steam that the family is interested in.  Can Dr. Coronado do this procedure using steam?  Please call Yung at [phone]. Thank you.     Action Taken: Message routed to:  [facility] ([facility]):  urology

## 2019-07-22 DIAGNOSIS — R35.1 BENIGN PROSTATIC HYPERPLASIA WITH NOCTURIA: Primary | ICD-10-CM

## 2019-07-22 DIAGNOSIS — N40.1 BENIGN PROSTATIC HYPERPLASIA WITH NOCTURIA: Primary | ICD-10-CM

## 2019-07-22 DIAGNOSIS — R39.15 URINARY URGENCY: Primary | ICD-10-CM

## 2019-07-22 RX ORDER — CIPROFLOXACIN 500 MG/1
500 TABLET, FILM COATED ORAL 2 TIMES DAILY
Qty: 14 TABLET | Refills: 0 | Status: SHIPPED | OUTPATIENT
Start: 2019-07-22 | End: 2019-07-29

## 2019-07-22 NOTE — PROGRESS NOTES
Discussion with patient and his nephew held regarding preference to change to Rezum procedure from HoLEP.  Reviewed in detail the risk/benefit/profile.  He has a considerable ball valving median lobe and this is an appropriate indication for the procedure.  Will plan to change to Rezum,, understands the potential for irritative LUTS and retention in post-op period.

## 2019-07-23 ENCOUNTER — TELEPHONE (OUTPATIENT)
Dept: UROLOGY | Facility: CLINIC | Age: 79
End: 2019-07-23

## 2019-07-23 ENCOUNTER — PATIENT OUTREACH (OUTPATIENT)
Dept: UROLOGY | Facility: CLINIC | Age: 79
End: 2019-07-23

## 2019-07-23 NOTE — TELEPHONE ENCOUNTER
[facility] Call Center    Phone Message    May a detailed message be left on voicemail: yes    Reason for Call: Other: pt's nephew calling with concerns about pt's burning sensation, pt was prescribed antibiotic but pt;s nephew is wondering fi this will take effect before pt's procedure tomorow, please call to discuss     Action Taken: Message routed to:  [facility] ([facility]): uro

## 2019-07-23 NOTE — PROGRESS NOTES
Called and spoke with Yung Ovi's nephew, to let him know that we will have the Rezum at [facility] by August 22nd. Procedure rescheduled to that date. Amy Montgomery RN

## 2019-08-19 ENCOUNTER — PATIENT OUTREACH (OUTPATIENT)
Dept: UROLOGY | Facility: CLINIC | Age: 79
End: 2019-08-19

## 2019-08-19 DIAGNOSIS — R39.15 URINARY URGENCY: Primary | ICD-10-CM

## 2019-08-19 RX ORDER — CIPROFLOXACIN 500 MG/1
500 TABLET, FILM COATED ORAL 2 TIMES DAILY
Qty: 14 TABLET | Refills: 0 | Status: SHIPPED | OUTPATIENT
Start: 2019-08-19 | End: 2019-08-26

## 2019-08-20 ENCOUNTER — PATIENT OUTREACH (OUTPATIENT)
Dept: UROLOGY | Facility: CLINIC | Age: 79
End: 2019-08-20

## 2019-08-20 ENCOUNTER — TELEPHONE (OUTPATIENT)
Dept: UROLOGY | Facility: CLINIC | Age: 79
End: 2019-08-20

## 2019-08-20 DIAGNOSIS — R39.15 URINARY URGENCY: ICD-10-CM

## 2019-08-20 LAB
ALBUMIN UR-MCNC: NEGATIVE MG/DL
APPEARANCE UR: ABNORMAL
BILIRUB UR QL STRIP: NEGATIVE
COLOR UR AUTO: YELLOW
GLUCOSE UR STRIP-MCNC: NEGATIVE MG/DL
HGB UR QL STRIP: NEGATIVE
HYALINE CASTS #/AREA URNS LPF: 3 /LPF (ref 0–2)
KETONES UR STRIP-MCNC: NEGATIVE MG/DL
LEUKOCYTE ESTERASE UR QL STRIP: NEGATIVE
MUCOUS THREADS #/AREA URNS LPF: PRESENT /LPF
NITRATE UR QL: NEGATIVE
PH UR STRIP: 5 PH (ref 5–7)
RBC #/AREA URNS AUTO: 2 /HPF (ref 0–2)
SOURCE: ABNORMAL
SP GR UR STRIP: 1.02 (ref 1–1.03)
SQUAMOUS #/AREA URNS AUTO: <1 /HPF (ref 0–1)
UROBILINOGEN UR STRIP-MCNC: 0 MG/DL (ref 0–2)
WBC #/AREA URNS AUTO: 1 /HPF (ref 0–5)

## 2019-08-20 PROCEDURE — 87086 URINE CULTURE/COLONY COUNT: CPT | Performed by: UROLOGY

## 2019-08-20 NOTE — OR NURSING
In basket message sent to HEIDI Hernandez re: patient having outdated PAC visit and no current H&P. Nothing is scheduled in [facility] or Care Everywhere. Inquiring about if Dr. Coronado plans to update H&P DOS since patient had PAC visit and then was rescheduled. Awaiting response.

## 2019-08-20 NOTE — TELEPHONE ENCOUNTER
[facility] Call Center    Phone Message    May a detailed message be left on voicemail: yes    Reason for Call: Other: Pt is at hospital now and wants to know if we need any other testing requested prior to his admit.  Please call Yung his son     Action Taken: Message routed to:  [facility] ([facility] urology

## 2019-08-21 LAB
BACTERIA SPEC CULT: NORMAL
BACTERIA SPEC CULT: NORMAL
SPECIMEN SOURCE: NORMAL

## 2019-08-21 NOTE — TELEPHONE ENCOUNTER
Returned call to Yung and answered his questions about patients procedure set for tomorrow. Amy Montgomery RN

## 2019-08-22 ENCOUNTER — HOSPITAL ENCOUNTER (OUTPATIENT)
Facility: CLINIC | Age: 79
Discharge: HOME | End: 2019-08-22
Attending: UROLOGY | Admitting: UROLOGY
Payer: MEDICARE

## 2019-08-22 ENCOUNTER — ANESTHESIA (OUTPATIENT)
Dept: SURGERY | Facility: CLINIC | Age: 79
End: 2019-08-22
Payer: MEDICARE

## 2019-08-22 VITALS
HEART RATE: 70 BPM | HEIGHT: 69 IN | TEMPERATURE: 96.8 F | DIASTOLIC BLOOD PRESSURE: 83 MMHG | WEIGHT: 206.35 LBS | OXYGEN SATURATION: 96 % | BODY MASS INDEX: 30.56 KG/M2 | SYSTOLIC BLOOD PRESSURE: 149 MMHG | RESPIRATION RATE: 16 BRPM

## 2019-08-22 DIAGNOSIS — R39.15 URINARY URGENCY: ICD-10-CM

## 2019-08-22 LAB — GLUCOSE BLDC GLUCOMTR-MCNC: 164 MG/DL (ref 70–99)

## 2019-08-22 PROCEDURE — 25800030 ZZH RX IP 258 OP 636: Performed by: STUDENT IN AN ORGANIZED HEALTH CARE EDUCATION/TRAINING PROGRAM

## 2019-08-22 PROCEDURE — 25000125 ZZHC RX 250: Performed by: STUDENT IN AN ORGANIZED HEALTH CARE EDUCATION/TRAINING PROGRAM

## 2019-08-22 PROCEDURE — 27210794 ZZH OR GENERAL SUPPLY STERILE: Performed by: UROLOGY

## 2019-08-22 PROCEDURE — 40000170 ZZH STATISTIC PRE-PROCEDURE ASSESSMENT II: Performed by: UROLOGY

## 2019-08-22 PROCEDURE — 37000008 ZZH ANESTHESIA TECHNICAL FEE, 1ST 30 MIN: Performed by: UROLOGY

## 2019-08-22 PROCEDURE — 37000009 ZZH ANESTHESIA TECHNICAL FEE, EACH ADDTL 15 MIN: Performed by: UROLOGY

## 2019-08-22 PROCEDURE — 25000125 ZZHC RX 250: Performed by: UROLOGY

## 2019-08-22 PROCEDURE — 71000027 ZZH RECOVERY PHASE 2 EACH 15 MINS: Performed by: UROLOGY

## 2019-08-22 PROCEDURE — 25000128 H RX IP 250 OP 636: Performed by: STUDENT IN AN ORGANIZED HEALTH CARE EDUCATION/TRAINING PROGRAM

## 2019-08-22 PROCEDURE — 25000132 ZZH RX MED GY IP 250 OP 250 PS 637: Mod: GY | Performed by: STUDENT IN AN ORGANIZED HEALTH CARE EDUCATION/TRAINING PROGRAM

## 2019-08-22 PROCEDURE — 82962 GLUCOSE BLOOD TEST: CPT

## 2019-08-22 PROCEDURE — 25000128 H RX IP 250 OP 636: Performed by: UROLOGY

## 2019-08-22 PROCEDURE — 36000074 ZZH SURGERY LEVEL 6 1ST 30 MIN - UMMC: Performed by: UROLOGY

## 2019-08-22 PROCEDURE — 36000076 ZZH SURGERY LEVEL 6 EA 15 ADDTL MIN - UMMC: Performed by: UROLOGY

## 2019-08-22 PROCEDURE — 27211024 ZZHC OR SUPPLY OTHER OPNP: Performed by: UROLOGY

## 2019-08-22 RX ORDER — ONDANSETRON 2 MG/ML
4 INJECTION INTRAMUSCULAR; INTRAVENOUS EVERY 30 MIN PRN
Status: CANCELLED | OUTPATIENT
Start: 2019-08-22

## 2019-08-22 RX ORDER — OXYBUTYNIN CHLORIDE 5 MG/1
5 TABLET ORAL 3 TIMES DAILY PRN
Qty: 15 TABLET | Refills: 0 | Status: SHIPPED | OUTPATIENT
Start: 2019-08-22 | End: 2019-08-29

## 2019-08-22 RX ORDER — FENTANYL CITRATE 50 UG/ML
INJECTION, SOLUTION INTRAMUSCULAR; INTRAVENOUS PRN
Status: DISCONTINUED | OUTPATIENT
Start: 2019-08-22 | End: 2019-08-22

## 2019-08-22 RX ORDER — MEPERIDINE HYDROCHLORIDE 25 MG/ML
12.5 INJECTION INTRAMUSCULAR; INTRAVENOUS; SUBCUTANEOUS
Status: CANCELLED | OUTPATIENT
Start: 2019-08-22

## 2019-08-22 RX ORDER — LEVOFLOXACIN 5 MG/ML
500 INJECTION, SOLUTION INTRAVENOUS EVERY 24 HOURS
Status: DISCONTINUED | OUTPATIENT
Start: 2019-08-22 | End: 2019-08-22 | Stop reason: HOSPADM

## 2019-08-22 RX ORDER — PROPOFOL 10 MG/ML
INJECTION, EMULSION INTRAVENOUS PRN
Status: DISCONTINUED | OUTPATIENT
Start: 2019-08-22 | End: 2019-08-22

## 2019-08-22 RX ORDER — LIDOCAINE HYDROCHLORIDE 20 MG/ML
INJECTION, SOLUTION INFILTRATION; PERINEURAL PRN
Status: DISCONTINUED | OUTPATIENT
Start: 2019-08-22 | End: 2019-08-22

## 2019-08-22 RX ORDER — ONDANSETRON 2 MG/ML
INJECTION INTRAMUSCULAR; INTRAVENOUS PRN
Status: DISCONTINUED | OUTPATIENT
Start: 2019-08-22 | End: 2019-08-22

## 2019-08-22 RX ORDER — LIDOCAINE HYDROCHLORIDE 20 MG/ML
JELLY TOPICAL PRN
Status: DISCONTINUED | OUTPATIENT
Start: 2019-08-22 | End: 2019-08-22 | Stop reason: HOSPADM

## 2019-08-22 RX ORDER — PROPOFOL 10 MG/ML
INJECTION, EMULSION INTRAVENOUS CONTINUOUS PRN
Status: DISCONTINUED | OUTPATIENT
Start: 2019-08-22 | End: 2019-08-22

## 2019-08-22 RX ORDER — TAMSULOSIN HYDROCHLORIDE 0.4 MG/1
0.8 CAPSULE ORAL DAILY
Qty: 60 CAPSULE | Refills: 1 | Status: SHIPPED | OUTPATIENT
Start: 2019-08-22 | End: 2020-09-15

## 2019-08-22 RX ORDER — NALOXONE HYDROCHLORIDE 0.4 MG/ML
.1-.4 INJECTION, SOLUTION INTRAMUSCULAR; INTRAVENOUS; SUBCUTANEOUS
Status: CANCELLED | OUTPATIENT
Start: 2019-08-22 | End: 2019-08-23

## 2019-08-22 RX ORDER — SODIUM CHLORIDE, SODIUM LACTATE, POTASSIUM CHLORIDE, CALCIUM CHLORIDE 600; 310; 30; 20 MG/100ML; MG/100ML; MG/100ML; MG/100ML
INJECTION, SOLUTION INTRAVENOUS CONTINUOUS PRN
Status: DISCONTINUED | OUTPATIENT
Start: 2019-08-22 | End: 2019-08-22

## 2019-08-22 RX ORDER — ACETAMINOPHEN 325 MG/1
975 TABLET ORAL ONCE
Status: COMPLETED | OUTPATIENT
Start: 2019-08-22 | End: 2019-08-22

## 2019-08-22 RX ORDER — LABETALOL HYDROCHLORIDE 5 MG/ML
10 INJECTION, SOLUTION INTRAVENOUS
Status: CANCELLED | OUTPATIENT
Start: 2019-08-22

## 2019-08-22 RX ORDER — FENTANYL CITRATE 50 UG/ML
25-50 INJECTION, SOLUTION INTRAMUSCULAR; INTRAVENOUS
Status: CANCELLED | OUTPATIENT
Start: 2019-08-22

## 2019-08-22 RX ORDER — ONDANSETRON 4 MG/1
4 TABLET, ORALLY DISINTEGRATING ORAL EVERY 30 MIN PRN
Status: CANCELLED | OUTPATIENT
Start: 2019-08-22

## 2019-08-22 RX ORDER — SODIUM CHLORIDE, SODIUM LACTATE, POTASSIUM CHLORIDE, CALCIUM CHLORIDE 600; 310; 30; 20 MG/100ML; MG/100ML; MG/100ML; MG/100ML
INJECTION, SOLUTION INTRAVENOUS CONTINUOUS
Status: CANCELLED | OUTPATIENT
Start: 2019-08-22

## 2019-08-22 RX ADMIN — LIDOCAINE HYDROCHLORIDE 100 MG: 20 INJECTION, SOLUTION INFILTRATION; PERINEURAL at 09:14

## 2019-08-22 RX ADMIN — ONDANSETRON 4 MG: 2 INJECTION INTRAMUSCULAR; INTRAVENOUS at 09:15

## 2019-08-22 RX ADMIN — ACETAMINOPHEN 975 MG: 325 TABLET, FILM COATED ORAL at 08:29

## 2019-08-22 RX ADMIN — PROPOFOL 20 MG: 10 INJECTION, EMULSION INTRAVENOUS at 09:26

## 2019-08-22 RX ADMIN — LEVOFLOXACIN 500 MG: 5 INJECTION, SOLUTION INTRAVENOUS at 09:20

## 2019-08-22 RX ADMIN — PROPOFOL 80 MCG/KG/MIN: 10 INJECTION, EMULSION INTRAVENOUS at 09:17

## 2019-08-22 RX ADMIN — FENTANYL CITRATE 25 MCG: 50 INJECTION, SOLUTION INTRAMUSCULAR; INTRAVENOUS at 09:24

## 2019-08-22 RX ADMIN — SODIUM CHLORIDE, POTASSIUM CHLORIDE, SODIUM LACTATE AND CALCIUM CHLORIDE: 600; 310; 30; 20 INJECTION, SOLUTION INTRAVENOUS at 09:05

## 2019-08-22 RX ADMIN — PROPOFOL 20 MG: 10 INJECTION, EMULSION INTRAVENOUS at 09:19

## 2019-08-22 ASSESSMENT — MIFFLIN-ST. JEOR: SCORE: 1651.38

## 2019-08-22 NOTE — BRIEF OP NOTE
[facility], [place]    Brief Operative Note    Pre-operative diagnosis: Bladder Outlet Obstruction  Post-operative diagnosis * No post-op diagnosis entered *  Procedure: Procedure(s):  Thermotherapy of Prostate Using Conductive Water Vapor (rezum procedure)  Surgeon: Surgeon(s) and Role:     * Emmett Coronado MD - Primary     * Kim Saeed MD - Resident - Assisting  Anesthesia: General   Estimated blood loss: 5cc  Drains: 18Fr sherwood catheter  Specimens: * No specimens in log *  Findings:   large median lobe. 10 vapor treatments administered. .  Complications: None.  Implants:  * No implants in log *

## 2019-08-22 NOTE — H&P
"        UROLOGY FOLLOW-UP NOTE          Chief Complaint:   Today I had the pleasure of seeing Mr. Brian Mehta in follow-up for a chief complaint of benign prostatic hypertrophy with lower urinary tract symptoms         Interval Update    Brian Mehta is a very pleasant 79 year old male     Brief  History:Brian Mehta  is a 79 year old male with a history of BPH with LUTS (urinary urgency, frequency, nocturia) who presents for cystoscopy to evaluate the degree of outlet obstruction. He has had bother from obstructive and irritative LUTS for several years.  Has been evaluated in the office previously and has been on and off flomax and finasteride. Recently seen because main concern is nocturia up to 5-6 times per evening.  Also notes slow, intermittent stream. On workup previously was noted to have an obstructing median lobe.  Prostate volume estimated to be 40-50 gm.    We have discussed treatment options and ultimately strong patient preference given for minimally invasive therapy.  They have requested rezum.  A long discussion has been had with them regarding the potential need for retreatment with this approach, unsatisfactory symptom improvement, post procedural need for catheter, irritative luts worsening, bleeding and infection.         Physical Exam:   Patient is a 79 year old  male   Vitals: Blood pressure (!) 142/86, pulse 92, temperature 98.8  F (37.1  C), temperature source Oral, resp. rate 16, height 1.753 m (5' 9\"), weight 93.6 kg (206 lb 5.6 oz), SpO2 97 %.    Physical Exam  Constitutional: Awake, alert, cooperative, no apparent distress, and appears stated age.  HENT: Normocephalic, oral pharynx with moist mucus membranes,   Respiratory: Clear to auscultation bilaterally, no crackles or wheezing.  Cardiovascular: RRR  GI: Soft, nontender  Skin: Warm and dry.      Labs: (personally reviewed)        Lab Results   Component Value Date     WBC 6.3 07/11/2019            Lab Results   Component Value " Date     RBC 5.22 07/11/2019            Lab Results   Component Value Date     HGB 14.4 07/11/2019            Lab Results   Component Value Date     HCT 46.1 07/11/2019            Lab Results   Component Value Date     MCV 88 07/11/2019            Lab Results     Urine culture <10K mixed brynn      UA RESULTS:   Recent Labs   Lab Test 08/20/19  1444 07/11/19  1601   SG 1.018 1.019   URINEPH 5.0 5.0   NITRITE Negative Negative   RBCU 2 3*   WBCU 1 19*       PSA RESULTS  PSA   Date Value Ref Range Status   01/18/2016 1.37 0 - 4 ug/L Final   11/11/2005 1.31 0 - 4 ug/L Final                Past Medical History:     Past Medical History:   Diagnosis Date     Diabetes mellitus (H)             Past Surgical History:     Past Surgical History:   Procedure Laterality Date     APPENDECTOMY              Medications     Current Facility-Administered Medications   Medication     levofloxacin (LEVAQUIN) infusion 500 mg     Provider ordered ALTERNATE pre op antibiotic.            Family History:   History reviewed. No pertinent family history.         Social History:     Social History     Socioeconomic History     Marital status:      Spouse name: Not on file     Number of children: Not on file     Years of education: Not on file     Highest education level: Not on file   Occupational History     Not on file   Social Needs     Financial resource strain: Not on file     Food insecurity:     Worry: Not on file     Inability: Not on file     Transportation needs:     Medical: Not on file     Non-medical: Not on file   Tobacco Use     Smoking status: Never Smoker     Smokeless tobacco: Never Used   Substance and Sexual Activity     Alcohol use: Not Currently     Drug use: Never     Sexual activity: Yes     Partners: Female   Lifestyle     Physical activity:     Days per week: Not on file     Minutes per session: Not on file     Stress: Not on file   Relationships     Social connections:     Talks on phone: Not on file     Gets  together: Not on file     Attends [employer] service: Not on file     Active member of club or organization: Not on file     Attends meetings of clubs or organizations: Not on file     Relationship status: Not on file     Intimate partner violence:     Fear of current or ex partner: Not on file     Emotionally abused: Not on file     Physically abused: Not on file     Forced sexual activity: Not on file   Other Topics Concern     Parent/sibling w/ CABG, MI or angioplasty before 65F 55M? Not Asked   Social History Narrative     Not on file            Allergies:   Pork allergy         Review of Systems:  From intake questionnaire   Negative 14 system review except as noted on HPI, nurse's note.

## 2019-08-22 NOTE — ANESTHESIA POSTPROCEDURE EVALUATION
Anesthesia POST Procedure Evaluation    Patient: Brian Mehta   MRN:     [id number] Gender:   male   Age:    79 year old :      1940        Preoperative Diagnosis: Bladder Outlet Obstruction   Procedure(s):  Thermotherapy of Prostate Using Conductive Water Vapor (rezum procedure)   Postop Comments: No value filed.       Anesthesia Type:  Not documented  General    Reportable Event: NO     PAIN: Uncomplicated   Sign Out status: Comfortable, Well controlled pain     PONV: No PONV   Sign Out status:  No Nausea or Vomiting     Neuro/Psych: Uneventful perioperative course   Sign Out Status: Preoperative baseline; Age appropriate mentation     Airway/Resp.: Uneventful perioperative course   Sign Out Status: Non labored breathing, age appropriate RR; Resp. Status within EXPECTED Parameters     CV: Uneventful perioperative course   Sign Out status: Appropriate BP and perfusion indices; Appropriate HR/Rhythm     Disposition:   Sign Out in:  PACU  Disposition:  Phase II; Home  Recovery Course: Uneventful  Follow-Up: Not required           Last Anesthesia Record Vitals:  CRNA VITALS  2019 0917 - 2019 1016      2019             Pulse:  88    SpO2:  99 %    Resp Rate (observed):  4  (Abnormal)           Last PACU Vitals:  Vitals Value Taken Time   /77 2019  9:51 AM   Temp 36.5  C (97.7  F) 2019  9:51 AM   Pulse 81 2019  9:51 AM   Resp 16 2019  9:51 AM   SpO2 98 % 2019  9:51 AM   Temp src     NIBP 167/92 2019  9:42 AM   Pulse 88 2019  9:47 AM   SpO2 99 % 2019  9:47 AM   Resp     Temp     Ht Rate 87 2019  9:42 AM   Temp 2 17.4  C (63.3  F) 2019  9:41 AM         Electronically Signed By: Angie Quintero MD, 2019, 10:16 AM

## 2019-08-22 NOTE — DISCHARGE INSTRUCTIONS
Same-Day Surgery   Adult Discharge Orders & Instructions     For 24 hours after surgery:  1. Get plenty of rest.  A responsible adult must stay with you for at least 24 hours after you leave the hospital.   2. Pain medication can slow your reflexes. Do not drive or use heavy equipment.  If you have weakness or tingling, don't drive or use heavy equipment until this feeling goes away.  3. Mixing alcohol and pain medication can cause dizziness and slow your breathing. It can even be fatal. Do not drink alcohol while taking pain medication.  4. Avoid strenuous or risky activities.  Ask for help when climbing stairs.   5. You may feel lightheaded.  If so, sit for a few minutes before standing.  Have someone help you get up.   6. If you have nausea (feel sick to your stomach), drink only clear liquids such as apple juice, ginger ale, broth or 7-Up.  Rest may also help.  Be sure to drink enough fluids.  Move to a regular diet as you feel able. Take pain medications with a small amount of solid food, such as toast or crackers, to avoid nausea.   7. A slight fever is normal. Call the doctor if your fever is over 100 F (37.7 C) (taken under the tongue) or lasts longer than 24 hours.  8. You may have a dry mouth, muscle aches, trouble sleeping or a sore throat.  These symptoms should go away after 24 hours.  9. Do not make important or legal decisions.   Pain Management:      1. Take pain medication (if prescribed) for pain as directed by your physician.        2. WARNING: If the pain medication you have been prescribed contains Tylenol  (acetaminophen), DO NOT take additional doses of Tylenol (acetaminophen).     Call your doctor for any of the followin.  Signs of infection (fever, growing tenderness at the surgery site, severe pain, a large amount of drainage or bleeding, foul-smelling drainage, redness, swelling).    2.  It has been over 8 to 10 hours since surgery and you are still not able to urinate (pee).    3.   Headache for over 24 hours.    4.  Numbness, tingling or weakness the day after surgery (if you had spinal anesthesia).  To contact a doctor, call _____Dr Coronado [phone]_____ or:      [phone] and ask for the Resident On Call for:          _________Urology_________ (answered 24 hours a day)      Emergency Department:  [place] [facility]: [phone]  [place] [facility]: [phone]               Rev. 10/2014   CARE OF INDWELLING MCKEON CATHETER  Cleanliness is VERY important!  1. Wash well around catheter with soap and water and rinse well.  Do this every morning and before bedtime.  2. Empty leg bag or bed bag into toilet whenever it becomes half full.  3. When disconnecting and reconnecting, wipe both the catheter end and tubing tip with alcohol. (You may use commercially prepared alcohol wipes or regular cotton balls soaked in alcohol.)  4. Rinse leg bag and bed bag inside and out after each use. You can soak leg bag and/or bed bag in two to three ounces of white vinegar when not in use. Rinse thoroughly before reconnecting to catheter.  5. You may clamp the catheter for short periods of time (two to three hours) if you are not uncomfortable. If planning intercourse: clamp catheter, disconnect from bag and   a) Tape catheter along shaft of penis, if male; or  b) Tape catheter to abdomen, if female  6. Drink lots of fluids (at least eight to ten cups/glasses per day) and take two to four grams of Vitamin C (optional) per day.      7. Watch for sign of catheter-associated urinary tract infection which include:      Cloudy urine, sediment in urine (may look like sand particles or white flakes), foul smelling urine    A burning feeling, pressure or pain in your lower abdomen    A burning feeling in the urethra or genitalia    Aching in the back (by the kidney)    At the time of discharge from the hospital, you have a 18 Mckeon catheter with a 10cc balloon. It was inserted on August  22, 2019 and should be changed one month from that date on 9/21/19.   Rev. 4/2014

## 2019-08-22 NOTE — OR NURSING
"Yung, Nephew, states that he will stay with pt overnight tonight as Brian's nursing staff is unable. Brian is oriented to situation, person and place, but is unable to tell me the correct year- stating its' \"2002\"  "

## 2019-08-22 NOTE — ANESTHESIA CARE TRANSFER NOTE
Patient: Brian Mehta    Procedure(s):  Thermotherapy of Prostate Using Conductive Water Vapor (rezum procedure)    Diagnosis: Bladder Outlet Obstruction  Diagnosis Additional Information: No value filed.    Anesthesia Type:   General     Note:  Airway :Face Mask  Patient transferred to:PACU  Comments: VSS. Breathing spontaneously at a regular rate with adequate tidal volumes and maintaining O2 sats on 6L facemask. Denies nausea or pain. No apparent complications from anesthesia.     Guillermo Jacques DO  CA-3  Handoff Report: Identifed the Patient, Identified the Reponsible Provider, Reviewed the pertinent medical history, Discussed the surgical course, Reviewed Intra-OP anesthesia mangement and issues during anesthesia, Set expectations for post-procedure period and Allowed opportunity for questions and acknowledgement of understanding      Vitals: (Last set prior to Anesthesia Care Transfer)    [facility] VITALS  8/22/2019 0917 - 8/22/2019 0951      8/22/2019             Pulse:  88    SpO2:  99 %    Resp Rate (observed):  4  (Abnormal)                 Electronically Signed By: Guillermo Jacques DO  August 22, 2019  9:51 AM

## 2019-08-23 ENCOUNTER — TELEPHONE (OUTPATIENT)
Dept: UROLOGY | Facility: CLINIC | Age: 79
End: 2019-08-23

## 2019-08-23 ENCOUNTER — ALLIED HEALTH/NURSE VISIT (OUTPATIENT)
Dept: UROLOGY | Facility: CLINIC | Age: 79
End: 2019-08-23
Payer: MEDICARE

## 2019-08-23 DIAGNOSIS — R39.15 URINARY URGENCY: Primary | ICD-10-CM

## 2019-08-23 RX ORDER — LIDOCAINE HYDROCHLORIDE 20 MG/ML
JELLY TOPICAL ONCE
Status: COMPLETED | OUTPATIENT
Start: 2019-08-23 | End: 2019-08-23

## 2019-08-23 RX ADMIN — LIDOCAINE HYDROCHLORIDE: 20 JELLY TOPICAL at 15:30

## 2019-08-23 NOTE — OP NOTE
Operative Report  8/23/2019    PREOPERATIVE DIAGNOSIS:  Prostatic hypertrophy with lower urinary tract symptoms  POSTOPERATIVE DIAGNOSIS: Same as above    PROCEDURE PERFORMED: Cystoscopy, destruction of prostate via water vapor therapy (rezum procedure)  ATTENDING SURGEON: Emmett Coronado MD  RESIDENT SURGEON: Kim Saeed MD    FINDINGS: Approximately 40 gm prostate with trilobar hypertrophy.   ANESTHESIA: [facility]   INTRAVENOUS FLUIDS: See anesthesia records  ESTIMATED BLOOD LOSS: Minimal   SPECIMENS: None  DRAINS:18-Filipino 2-way catheter with 10 ml in balloon     INDICATIONS FOR PROCEDURE: Brian Mehta is a(n) 79 year old male who was seen in consultation for benign prostatic hypertrophy with lower urinary tract symptoms.  He has failed medical therapy for greater than 3 months and has bothersome symptoms with an IPSS score greater than 13 (22/5) . His prostate volume has been estimated at 40 ml with a large median lobe.  After discussion of the risks, benefits and alternatives of the procedure, the patient desired to proceed with the above stated procdure.    DESCRIPTION OF PROCEDURE: After obtaining informed consent, the patient was taken to the operating room and placed under anesthesia.  He was repositioned in dorsal lithotomy making sure that the legs were positioned and padded safely.  He was then prepped and draped in standard sterile fashion.  Culture sensitive antibiotics were administered and bilateral sequential compression devices were placed.  A time out was performed confirming the appropriate patient identity and planned procedure.     The procedure was begun by generously lubricating the urethra.  The Rezum equipped cystoscope was then lubricated and placed into the patient's urethra and cystoscopy was performed into the bladder. We then calculated a distance from the bladder neck to the verumontanum of approximately 4 cm. We performed 2 Rezum treatments per lateral lobe at approximately 1 cm  increments. We did perform another 2 treatments into the median lobe on contralateral sides in a staggered fashion.  We provided one more treatment to the hypertrophied central zone as well.  The patient tolerated the procedure without difficulty. An 18-Urdu catheter was then placed into the patient's bladder and the balloon inflated with 10 cc of water. The catheter was secured to the leg.  The urine was clear.      POSTOPERATIVE PLAN:   -We will monitor the patient post operatively in the recovery room with plan for discharge today and voiding trial in 3-5 days.    Emmett Coronado

## 2019-08-23 NOTE — TELEPHONE ENCOUNTER
[facility] Call Center    Phone Message    May a detailed message be left on voicemail: yes    Reason for Call: Other: Pt has some general questions regarding catheter bag from procedure. Please call Pt     Action Taken: Message routed to:  [facility] urology

## 2019-08-23 NOTE — TELEPHONE ENCOUNTER
Patient will come in for a nurse visit today at 3:00 pm to get a replacement with the Urology nurse. Patient family agreed with the plan.      Ana Moore MA

## 2019-08-23 NOTE — PATIENT INSTRUCTIONS
Please follow up as scheduled with Dr. Coronado next week.    If this catheter comes out before your next appointment you MUST go to the emergency room to have one placed.    It was a pleasure meeting with you today.  Thank you for allowing me and my team the privilege of caring for you today.  YOU are the reason we are here, and I truly hope we provided you with the excellent service you deserve.  Please let us know if there is anything else we can do for you so that we can be sure you are leaving completely satisfied with your care experience.      Antony Montgomery, EMT

## 2019-08-23 NOTE — PROGRESS NOTES
Chief Complaint   Patient presents with     Allied Health Visit     Leg Bag Change     Upon speaking to the pt and his nephew, it was clear the patient pulled/removed his catheter sometime in the night and is here to have another one placed. Patient is one day post REZUM Treatment.      There were no vitals taken for this visit. There is no height or weight on file to calculate BMI.    Patient Active Problem List   Diagnosis     Microscopic hematuria     Memory loss       Allergies   Allergen Reactions     Pork Allergy        Current Outpatient Medications   Medication Sig Dispense Refill     aspirin 81 MG EC tablet Take 1 tablet by mouth every morning        ciprofloxacin (CIPRO) 500 MG tablet Take 1 tablet (500 mg) by mouth 2 times daily for 7 days 14 tablet 0     gabapentin (NEURONTIN) 100 MG capsule Take 100 mg by mouth daily   0     metFORMIN (GLUCOPHAGE) 500 MG tablet Take 500 mg by mouth every morning   5     multivitamin (ONE-DAILY) tablet Take 1 tablet by mouth every morning   3     oxybutynin (DITROPAN) 5 MG tablet Take 1 tablet (5 mg) by mouth 3 times daily as needed for bladder spasms 15 tablet 0     phenazopyridine (AZO) 97.5 MG tablet Take 1 tablet (97.5 mg) by mouth 3 times daily for 3 days 9 tablet 0     tamsulosin (FLOMAX) 0.4 MG 24 hr capsule Take 1 capsule (0.4 mg) by mouth daily (Patient taking differently: Take 0.4 mg by mouth At Bedtime ) 90 capsule 3     tamsulosin (FLOMAX) 0.4 MG capsule Take 2 capsules (0.8 mg) by mouth daily 60 capsule 1       Social History     Tobacco Use     Smoking status: Never Smoker     Smokeless tobacco: Never Used   Substance Use Topics     Alcohol use: Not Currently     Drug use: Never     The following medication was given:     Patient is taking Cipro, and another dose was not given in clinic.    MEDICATION: Lidocaine Uro-Jet 2% 200mg (20mg/mL)  ROUTE: Urethral   SITE: Urethra   DOSE: 10mL  LOT #: [id number]  : [employer]   EXPIRATION DATE: 4-21 NDC#:  [address]-73   Was there drug waste? No    Prior to injection, verified patient identity using patient's name and date of birth.  Due to injection administration, patient instructed to remain in clinic for 15 minutes  afterwards, and to report any adverse reaction to me immediately.    Drug Amount Wasted:  None.  Vial/Syringe: Single dose vial    Brian Mehta comes into clinic today at the request of Dr. Coronado for Post Op Cath Placement .    Order has been verified: Yes.      Prior to administration, verified patient identity using patient's name and date of birth.    Drug Amount Wasted:  None.  Vial/Syringe: Single dose      Allergies   Allergen Reactions     Pork Allergy        Insertion:  18 Fr straight tipped latex sherwood catheter inserted into urethral meatus in the usual sterile fashion without difficulty.  Balloon filled with 10 mL sterile H2O.  Received 100 ml red urine output.   Catheter secured in place with leg strap: Yes.     Patient did tolerate procedure well.     Patient instructed as to where to call or go for pain, fever, leakage, or decreased urine flow.     This service provided today was under the direct supervision of Pita Andre, who was available if needed.    Catheter placement was okayed by Dr. Coronado via phone.  Pts PVR was >200mL via scan.      Antony Montgomery, EMT  8/23/2019  2:52 PM

## 2019-08-26 ENCOUNTER — PATIENT OUTREACH (OUTPATIENT)
Dept: UROLOGY | Facility: CLINIC | Age: 79
End: 2019-08-26

## 2019-08-27 ENCOUNTER — ALLIED HEALTH/NURSE VISIT (OUTPATIENT)
Dept: UROLOGY | Facility: CLINIC | Age: 79
End: 2019-08-27
Payer: MEDICARE

## 2019-08-27 DIAGNOSIS — R39.15 URINARY URGENCY: Primary | ICD-10-CM

## 2019-08-27 RX ORDER — LIDOCAINE HYDROCHLORIDE 20 MG/ML
JELLY TOPICAL ONCE
Status: COMPLETED | OUTPATIENT
Start: 2019-08-27 | End: 2019-08-27

## 2019-08-27 RX ORDER — CIPROFLOXACIN 500 MG/1
500 TABLET, FILM COATED ORAL ONCE
Status: COMPLETED | OUTPATIENT
Start: 2019-08-27 | End: 2019-08-27

## 2019-08-27 RX ADMIN — LIDOCAINE HYDROCHLORIDE: 20 JELLY TOPICAL at 16:30

## 2019-08-27 RX ADMIN — CIPROFLOXACIN 500 MG: 500 TABLET, FILM COATED ORAL at 15:04

## 2019-08-27 NOTE — PATIENT INSTRUCTIONS
Please schedule an Appointment with Dr. Coronado in two weeks for a TOV.    If your catheter clogs or you experience a fever please go to the Emergency Room    It was a pleasure meeting with you today.  Thank you for allowing me and my team the privilege of caring for you today.  YOU are the reason we are here, and I truly hope we provided you with the excellent service you deserve.  Please let us know if there is anything else we can do for you so that we can be sure you are leaving completely satisfied with your care experience.      Antony Montgomery, EMT

## 2019-08-27 NOTE — PROGRESS NOTES
Chief Complaint   Patient presents with     Allied Health Visit     Return for TOV       Patient Active Problem List   Diagnosis     Microscopic hematuria     Memory loss       Allergies   Allergen Reactions     Pork Allergy        Current Outpatient Medications   Medication Sig Dispense Refill     aspirin 81 MG EC tablet Take 1 tablet by mouth every morning        gabapentin (NEURONTIN) 100 MG capsule Take 100 mg by mouth daily   0     metFORMIN (GLUCOPHAGE) 500 MG tablet Take 500 mg by mouth every morning   5     multivitamin (ONE-DAILY) tablet Take 1 tablet by mouth every morning   3     oxybutynin (DITROPAN) 5 MG tablet Take 1 tablet (5 mg) by mouth 3 times daily as needed for bladder spasms 15 tablet 0     tamsulosin (FLOMAX) 0.4 MG 24 hr capsule Take 1 capsule (0.4 mg) by mouth daily (Patient taking differently: Take 0.4 mg by mouth At Bedtime ) 90 capsule 3     tamsulosin (FLOMAX) 0.4 MG capsule Take 2 capsules (0.8 mg) by mouth daily 60 capsule 1       Social History     Tobacco Use     Smoking status: Never Smoker     Smokeless tobacco: Never Used   Substance Use Topics     Alcohol use: Not Currently     Drug use: Never       Brian Mehta comes into clinic today at the request of Emmett Coronado for TOV / catheter removal.    This service provided today was under the direct supervision of Nubia GAN, who was available if needed.    Brian Mehta presented today for a trial of void.  Approximately 140 mL of normal saline instilled into bladder via catheter.  Patient stated he had a strong urge to urinate and catheter was removed without difficulty.  Patient was given a graduated cylinder  to measure urine output.  Patient voided approximately 10 mL of pink urine.   mL.    Cipro 500 given per protocol: Yes  The following medication was given:     MEDICATION:  Ciprofloxacin   ROUTE: PO  SITE: Medication was given orally   DOSE: 500mg  LOT #: [id number]   : [employer]    EXPIRATION DATE: 10/20  NDC#: [address]-25   Was there drug waste? No    Prior to medication administration, verified patient identity using patient's name and date of birth.  Due to medication administration, patient instructed to remain in clinic for 15 minutes  afterwards, and to report any adverse reaction to me immediately.    Drug Amount Wasted:  None.  Vial/Syringe: Single dose vial    Patient did tolerate procedure well.    Patient failed TOV, and was unable to void more than ~30mL    Brian Mehta comes into clinic today at the request of Dr. Coronado for Failed TOV Sherwood Cath Placement.    Order has been verified: Yes.    The following medication was given:     MEDICATION:  Lidocaine 2% Soln  ROUTE: Urethral  SITE: Urethra  DOSE: 10mL (200mg of 20mg/mL)  LOT #: [id number]  : [employer]  EXPIRATION DATE: 4/21  NDC#: [address]-00   Was there drug waste? No    Prior to administration, verified patient identity using patient's name and date of birth.    Drug Amount Wasted:  None.  Vial/Syringe: Single dose      Allergies   Allergen Reactions     Pork Allergy        Removal:  18 Fr straight tipped latex sherwood catheter removed from urethral meatus without difficulty after removing 10mL of fluid from the balloon.    Insertion:  16 Fr straight tipped latex sherwood catheter inserted into urethral meatus in the usual sterile fashion without difficulty.  Balloon filled with 10 mL sterile H2O.  Received 20 ml red urine output.   Catheter secured in place with leg strap: N/A.     Patient did tolerate procedure well.     This service provided today was under the direct supervision of Jennifer GAN, who was available if needed.    The decision to downsize the patients catheter was due to swelling of the head of his penis/ foreskin, and the foreskin being stuck behind the head of the penis due to swelling. With assistance of Jennifer GAN staff was able to return the patients foreskin to its normal position and  decrease the swelling.   After returning the foreskin to its natural position, the patients catheter was still draining heavy red urine/blood. Placement was confirmed with the drainage of urine and use of PVR Scan after fluid was instilled to the bladder to see if the fluid level increased.   Staff attempted to irrigate at that time, but the catheter was plugged with a blood clot. We were unable to clear the clot through the 16 Fr Straight tip cath, so it was removed without incident. A 18 Fr Coude Tip cath was then placed via the urethra into the bladder without incident using Lidocaine for lubricant. Placement was confirmed with drainage of red urine.  The patient was then irrigated with 1000mL of Sterile water. Multiple clots were expelled and drained. By the end of the irrigation the catheter was draining well and the fluid was near clear.  He tolerated the irrigation well and without incident.   He was instructed to drink fluids and keep his urine a light pink to clear color, and to proceed to the emergency room if he experiences and fevers or flu like symptoms or if his catheter stops draining.    Removal:  16 Fr straight tipped latex sherwood catheter removed from urethral meatus without difficulty after removing 10mL of fluid from the balloon.    Insertion:  18 Fr Coude tipped latex sherwood catheter inserted into urethral meatus in the usual sterile fashion without difficulty.  Balloon filled with 10 mL sterile H2O.  Received 250 ml pink urine output.   Catheter secured in place with leg strap: Yes.     Patient did tolerate procedure well.     Patient instructed as to where to call or go for pain, fever, leakage, or decreased urine flow.     This service provided today was under the direct supervision of Jennifer GAN, who was available if needed.    He will follow up with Dr. Coronado in two weeks with another TOV.    Antony Montgomery, EMT  8/27/2019  5:46 PM

## 2019-08-29 ENCOUNTER — TELEPHONE (OUTPATIENT)
Dept: UROLOGY | Facility: CLINIC | Age: 79
End: 2019-08-29

## 2019-08-29 DIAGNOSIS — R39.15 URINARY URGENCY: ICD-10-CM

## 2019-08-29 RX ORDER — OXYBUTYNIN CHLORIDE 5 MG/1
5 TABLET ORAL 3 TIMES DAILY PRN
Qty: 15 TABLET | Refills: 0 | Status: SHIPPED | OUTPATIENT
Start: 2019-08-29

## 2019-08-29 NOTE — TELEPHONE ENCOUNTER
[facility] Call Center    Phone Message    May a detailed message be left on voicemail: yes    Reason for Call: Medication Refill Request    Has the patient contacted the pharmacy for the refill? Yes   Name of medication being requested: ciprofloxacin (CIPRO) 500 MG tablet  Provider who prescribed the medication: Dr Lawton  Pharmacy: [facility] at [address] [place] [address] [phone]  Date medication is needed: Asap    Pt will be out of the antibiotic today.       Action Taken: Message routed to:  [facility]

## 2019-08-29 NOTE — TELEPHONE ENCOUNTER
[facility] Call Center    Phone Message    May a detailed message be left on voicemail: yes    Reason for Call: Other: Yung called back saying he missed a call. Please call him back to further discuss.      Action Taken: Message routed to:  [facility]

## 2019-08-29 NOTE — TELEPHONE ENCOUNTER
Patient called and needs more ditropan  Also needs appt with domingo on Tuesday Lynsey Lopez LPN Staff Nurse

## 2019-08-29 NOTE — TELEPHONE ENCOUNTER
[facility] Call Center    Phone Message    May a detailed message be left on voicemail: yes    Reason for Call: Medication Refill Request    Has the patient contacted the pharmacy for the refill? Yes   Name of medication being requested: oxybutynin (DITROPAN) 5 MG tablet     Provider who prescribed the medication: Cliff  Pharmacy: [address] [address] [phone]  Date medication is needed: asap         Action Taken: Message routed to:  [facility] urology

## 2019-09-03 ENCOUNTER — PRE VISIT (OUTPATIENT)
Dept: UROLOGY | Facility: CLINIC | Age: 79
End: 2019-09-03

## 2019-09-03 ENCOUNTER — NURSE TRIAGE (OUTPATIENT)
Dept: NURSING | Facility: CLINIC | Age: 79
End: 2019-09-03

## 2019-09-03 NOTE — TELEPHONE ENCOUNTER
The pts nephew called: patient missed today's appointment - (tov) however pt is having quite a bit of pain from the catheter.   The clinic was called to determine if the patient could still be seen - they are unable to add him in today.   Recommendation is to be seen in the ER and the nephew agrees. He was transfered back to scheduling to make a new appointment.      Kathleen M Doege RN

## 2019-09-04 ENCOUNTER — TELEPHONE (OUTPATIENT)
Dept: UROLOGY | Facility: CLINIC | Age: 79
End: 2019-09-04

## 2019-09-04 ENCOUNTER — ALLIED HEALTH/NURSE VISIT (OUTPATIENT)
Dept: UROLOGY | Facility: CLINIC | Age: 79
End: 2019-09-04
Payer: MEDICARE

## 2019-09-04 DIAGNOSIS — R39.15 URINARY URGENCY: Primary | ICD-10-CM

## 2019-09-04 RX ORDER — CIPROFLOXACIN 500 MG/1
500 TABLET, FILM COATED ORAL ONCE
Status: COMPLETED | OUTPATIENT
Start: 2019-09-04 | End: 2019-09-04

## 2019-09-04 RX ADMIN — CIPROFLOXACIN 500 MG: 500 TABLET, FILM COATED ORAL at 11:15

## 2019-09-04 NOTE — NURSING NOTE
Chief Complaint   Patient presents with     Allied Health Visit     TOV       Patient Active Problem List   Diagnosis     Microscopic hematuria     Memory loss       Allergies   Allergen Reactions     Pork Allergy        Current Outpatient Medications   Medication Sig Dispense Refill     aspirin 81 MG EC tablet Take 1 tablet by mouth every morning        gabapentin (NEURONTIN) 100 MG capsule Take 100 mg by mouth daily   0     metFORMIN (GLUCOPHAGE) 500 MG tablet Take 500 mg by mouth every morning   5     multivitamin (ONE-DAILY) tablet Take 1 tablet by mouth every morning   3     oxybutynin (DITROPAN) 5 MG tablet Take 1 tablet (5 mg) by mouth 3 times daily as needed for bladder spasms 15 tablet 0     tamsulosin (FLOMAX) 0.4 MG 24 hr capsule Take 1 capsule (0.4 mg) by mouth daily (Patient taking differently: Take 0.4 mg by mouth At Bedtime ) 90 capsule 3     tamsulosin (FLOMAX) 0.4 MG capsule Take 2 capsules (0.8 mg) by mouth daily 60 capsule 1       Social History     Tobacco Use     Smoking status: Never Smoker     Smokeless tobacco: Never Used   Substance Use Topics     Alcohol use: Not Currently     Drug use: Never       Brian Mehta comes into clinic today at the request of Emmett Coronado for TOV / catheter removal.    This service provided today was under the direct supervision of Tona Andre NP, who was available if needed.    Brian Mehta presented today for a trial of void.  Approximately 150 mL of normal saline instilled into bladder via catheter.  Patient stated he had urge to urinate and catheter was removed without difficulty.  Patient was given a cylinder to measure urine output.  Patient voided approximately 100 mL of yellow urine.  PVR 0 mL.    Cipro 500 given per protocol: Yes  The following medication was given:     MEDICATION:  Ciprofloxacin   ROUTE: PO  SITE: mouth  DOSE: 500  mg  LOT #: [id number]  : [employer]  EXPIRATION DATE: 10/20  NDC#: 01 003 [id number] 070 11 2    Was there drug waste? No    Prior to medication administration, verified patient identity using patient's name and date of birth.  Due to medication administration, patient instructed to remain in clinic for 15 minutes  afterwards, and to report any adverse reaction to me immediately.    Drug Amount Wasted:  None.      Patient did tolerate procedure well.    Teaching done with patient verbally as where to call or go if pain, fever, or unable to urinate post catheter removal.    Doreen Thompson, EMT  9/4/2019  10:29 AM

## 2019-09-04 NOTE — TELEPHONE ENCOUNTER
Called patient at 0817 to reschedule Nurse Visit to after 11am on 9/4. VM not set up, message was NOT left.  -PJ

## 2019-09-18 ENCOUNTER — TELEPHONE (OUTPATIENT)
Dept: UROLOGY | Facility: CLINIC | Age: 79
End: 2019-09-18

## 2020-06-03 ENCOUNTER — TELEPHONE (OUTPATIENT)
Dept: UROLOGY | Facility: CLINIC | Age: 80
End: 2020-06-03

## 2020-06-03 NOTE — TELEPHONE ENCOUNTER
"[facility] Call Center    Phone Message    May a detailed message be left on voicemail: yes     Reason for Call: Symptoms or Concerns     If patient has red-flag symptoms, warm transfer to triage line    Current symptom or concern: not feeling well.     Symptoms have been present for:  3-4 day(s)    Has patient previously been seen for this? Yes    By : Dr. Coronado        Are there any new or worsening symptoms? Yes: Patients granddaughter calling stating that patient states \"he is not himself\" Patients granddaughter is not sure if patient just will not tell her because she is the granddaughter but she would like a return call to discuss thank you.       Action Taken: Message routed to:  [facility] urology    Travel Screening: Not Applicable                                                                        "

## 2020-06-04 NOTE — TELEPHONE ENCOUNTER
Message left for patient to call back.  I am concerned patient has possible urinary retention or UTI.  I did schedule an in clinic appointment with Dr. Coronado on 6/9 at 1200.  Asked patient to call back and confirm he is able to make this appointment on Tuesday.    Chloe Galdamez, [facility]

## 2020-06-08 ENCOUNTER — PRE VISIT (OUTPATIENT)
Dept: UROLOGY | Facility: CLINIC | Age: 80
End: 2020-06-08

## 2020-06-08 NOTE — TELEPHONE ENCOUNTER
Nocturia follow up.  Patient has not been acting like himself per family member.  Patient will also need family member, Yung Mehta, to escort to appointment.  patient has memory issues.  All records available in EPIC.    Chloe Galdamez CMA

## 2020-06-09 ENCOUNTER — OFFICE VISIT (OUTPATIENT)
Dept: UROLOGY | Facility: CLINIC | Age: 80
End: 2020-06-09
Payer: MEDICARE

## 2020-06-09 VITALS
BODY MASS INDEX: 30.07 KG/M2 | WEIGHT: 203 LBS | SYSTOLIC BLOOD PRESSURE: 151 MMHG | DIASTOLIC BLOOD PRESSURE: 84 MMHG | HEART RATE: 70 BPM | HEIGHT: 69 IN

## 2020-06-09 DIAGNOSIS — R39.15 URINARY URGENCY: Primary | ICD-10-CM

## 2020-06-09 PROBLEM — R30.9 PAIN ASSOCIATED WITH MICTURITION: Status: ACTIVE | Noted: 2019-01-22

## 2020-06-09 LAB
ALBUMIN UR-MCNC: NEGATIVE MG/DL
APPEARANCE UR: ABNORMAL
BACTERIA #/AREA URNS HPF: ABNORMAL /HPF
BILIRUB UR QL STRIP: NEGATIVE
COLOR UR AUTO: YELLOW
GLUCOSE UR STRIP-MCNC: NEGATIVE MG/DL
HGB UR QL STRIP: ABNORMAL
HYALINE CASTS #/AREA URNS LPF: 2 /LPF (ref 0–2)
KETONES UR STRIP-MCNC: NEGATIVE MG/DL
LEUKOCYTE ESTERASE UR QL STRIP: ABNORMAL
MUCOUS THREADS #/AREA URNS LPF: PRESENT /LPF
NITRATE UR QL: POSITIVE
PH UR STRIP: 5 PH (ref 5–7)
RBC #/AREA URNS AUTO: 11 /HPF (ref 0–2)
SOURCE: ABNORMAL
SP GR UR STRIP: 1.01 (ref 1–1.03)
SQUAMOUS #/AREA URNS AUTO: 1 /HPF (ref 0–1)
UROBILINOGEN UR STRIP-MCNC: 0 MG/DL (ref 0–2)
WBC #/AREA URNS AUTO: >182 /HPF (ref 0–5)

## 2020-06-09 PROCEDURE — 87086 URINE CULTURE/COLONY COUNT: CPT | Performed by: UROLOGY

## 2020-06-09 RX ORDER — CEPHALEXIN 500 MG/1
500 CAPSULE ORAL 3 TIMES DAILY
Qty: 30 CAPSULE | Refills: 0 | Status: SHIPPED | OUTPATIENT
Start: 2020-06-09 | End: 2020-09-15

## 2020-06-09 RX ORDER — TAMSULOSIN HYDROCHLORIDE 0.4 MG/1
0.4 CAPSULE ORAL DAILY
Qty: 30 CAPSULE | Refills: 11 | Status: SHIPPED | OUTPATIENT
Start: 2020-06-09 | End: 2020-09-15

## 2020-06-09 RX ORDER — LISINOPRIL 10 MG/1
10 TABLET ORAL
COMMUNITY
Start: 2020-02-05

## 2020-06-09 ASSESSMENT — MIFFLIN-ST. JEOR: SCORE: 1631.18

## 2020-06-09 ASSESSMENT — PAIN SCALES - GENERAL: PAINLEVEL: NO PAIN (0)

## 2020-06-09 NOTE — LETTER
6/9/2020       RE: Brian Mehta  [address]  [facility] [address]     Dear Colleague,    Thank you for referring your patient, Brian Mehta, to the [facility] UROLOGY AND INST FOR PROSTATE AND UROLOGIC CANCERS at [facility]. Please see a copy of my visit note below.          UROLOGY TELEPHONE FOLLOW-UP NOTE           Chief Complaint:   LUTS         Interval Update    rBian Mehta is a very pleasant 79 yo M     Brief  History: Underwent Rezum 8/19, initially was doing much better but recently his nocturia has started to return and he is getting less sleep again because he is waking up 4-5 times per night.  He claims that his stream is adequate.  Feels like he empties all the way. Has not taken flomax since surgery.  Denies hematuria, dysuria, recent UTI. He is accompanied by his son who notes that his symptoms are perhaps more than he is sharing today and he seems like he has had some progressive confusion, frequency and occasional incontinence episodes.    Today notes: PVR 90 cc      Labs and Pathology:    I personally reviewed all applicable laboratory data and went over findings with patient  Significant for:    CBC RESULTS:  Recent Labs   Lab Test 07/11/19  1553   WBC 6.3   HGB 14.4           BMP RESULTS:  Recent Labs   Lab Test 07/11/19  1553      POTASSIUM 4.0   CHLORIDE 104   CO2 31   ANIONGAP 3   *   BUN 20   CR 1.07   GFRESTIMATED 66   GFRESTBLACK 77   JULIOCESAR 8.4*       UA RESULTS:   Recent Labs   Lab Test 08/20/19  1444 07/11/19  1601   SG 1.018 1.019   URINEPH 5.0 5.0   NITRITE Negative Negative   RBCU 2 3*   WBCU 1 19*     Urine dip notes positive nitrate, trace blood, and LE      PSA RESULTS  PSA   Date Value Ref Range Status   01/18/2016 1.37 0 - 4 ug/L Final   11/11/2005 1.31 0 - 4 ug/L Final            Assessment/Plan   80 year old male with BPH/LUTS s/p Rezum now with UTI  -Suspect recent symptoms could be related to UTI  -Will  start empiric keflex, send urine for culture  -Also discussed retrialing flomax as some symptoms could be BPH related as well           Past Medical History:     Past Medical History:   Diagnosis Date     Diabetes mellitus (H)             Past Surgical History:     Past Surgical History:   Procedure Laterality Date     APPENDECTOMY       TRANSURETHERAL DESTRUCTION OF PROSTATE BY THERMOTHERAPY N/A 8/22/2019    Procedure: Thermotherapy of Prostate Using Conductive Water Vapor (rezum procedure);  Surgeon: Emmett Coronado MD;  Location:  OR            Medications     Current Outpatient Medications   Medication     aspirin 81 MG EC tablet     gabapentin (NEURONTIN) 100 MG capsule     lisinopril (ZESTRIL) 10 MG tablet     metFORMIN (GLUCOPHAGE) 500 MG tablet     multivitamin (ONE-DAILY) tablet     oxybutynin (DITROPAN) 5 MG tablet     tamsulosin (FLOMAX) 0.4 MG 24 hr capsule     tamsulosin (FLOMAX) 0.4 MG capsule     No current facility-administered medications for this visit.             Family History:   No family history on file.         Social History:     Social History     Socioeconomic History     Marital status:      Spouse name: Not on file     Number of children: Not on file     Years of education: Not on file     Highest education level: Not on file   Occupational History     Not on file   Social Needs     Financial resource strain: Not on file     Food insecurity     Worry: Not on file     Inability: Not on file     Transportation needs     Medical: Not on file     Non-medical: Not on file   Tobacco Use     Smoking status: Never Smoker     Smokeless tobacco: Never Used   Substance and Sexual Activity     Alcohol use: Not Currently     Drug use: Never     Sexual activity: Yes     Partners: Female   Lifestyle     Physical activity     Days per week: Not on file     Minutes per session: Not on file     Stress: Not on file   Relationships     Social connections     Talks on phone: Not on file      Gets together: Not on file     Attends Restorationist service: Not on file     Active member of club or organization: Not on file     Attends meetings of clubs or organizations: Not on file     Relationship status: Not on file     Intimate partner violence     Fear of current or ex partner: Not on file     Emotionally abused: Not on file     Physically abused: Not on file     Forced sexual activity: Not on file   Other Topics Concern     Parent/sibling w/ CABG, MI or angioplasty before 65F 55M? Not Asked   Social History Narrative     Not on file            Allergies:   Pork allergy         Review of Systems:  From intake questionnaire   Negative 14 system review except as noted on HPI, nurse's note.        CC:  [facility]

## 2020-06-09 NOTE — PATIENT INSTRUCTIONS
Prescription for tamsulosin (Flomax) has been sent to your pharmacy.    Urine sent for testing today.  Dr. Coronado will notify you of the results.    Follow up with Dr. Coronado in 3 months (in clinic).     It was a pleasure meeting with you today.  Thank you for allowing me and my team the privilege of caring for you today.  YOU are the reason we are here, and I truly hope we provided you with the excellent service you deserve.  Please let us know if there is anything else we can do for you so that we can be sure you are leaving completely satisfied with your care experience.        Chloe Galdamez, CMA

## 2020-06-09 NOTE — NURSING NOTE
Chief Complaint   Patient presents with     RECHECK     Nocturia follow up     Chloe Galdamez, CMA

## 2020-06-09 NOTE — PROGRESS NOTES
UROLOGY TELEPHONE FOLLOW-UP NOTE           Chief Complaint:   LUTS         Interval Update    Brian Mehta is a very pleasant 81 yo M     Brief  History: Underwent Rezum 8/19, initially was doing much better but recently his nocturia has started to return and he is getting less sleep again because he is waking up 4-5 times per night.  He claims that his stream is adequate.  Feels like he empties all the way. Has not taken flomax since surgery.  Denies hematuria, dysuria, recent UTI. He is accompanied by his son who notes that his symptoms are perhaps more than he is sharing today and he seems like he has had some progressive confusion, frequency and occasional incontinence episodes.    Today notes: PVR 90 cc      Labs and Pathology:    I personally reviewed all applicable laboratory data and went over findings with patient  Significant for:    CBC RESULTS:  Recent Labs   Lab Test 07/11/19  1553   WBC 6.3   HGB 14.4           BMP RESULTS:  Recent Labs   Lab Test 07/11/19  1553      POTASSIUM 4.0   CHLORIDE 104   CO2 31   ANIONGAP 3   *   BUN 20   CR 1.07   GFRESTIMATED 66   GFRESTBLACK 77   JULIOCESAR 8.4*       UA RESULTS:   Recent Labs   Lab Test 08/20/19  1444 07/11/19  1601   SG 1.018 1.019   URINEPH 5.0 5.0   NITRITE Negative Negative   RBCU 2 3*   WBCU 1 19*     Urine dip notes positive nitrate, trace blood, and LE      PSA RESULTS  PSA   Date Value Ref Range Status   01/18/2016 1.37 0 - 4 ug/L Final   11/11/2005 1.31 0 - 4 ug/L Final            Assessment/Plan   80 year old male with BPH/LUTS s/p Rezum now with UTI  -Suspect recent symptoms could be related to UTI  -Will start empiric keflex, send urine for culture  -Also discussed retrialing flomax as some symptoms could be BPH related as well           Past Medical History:     Past Medical History:   Diagnosis Date     Diabetes mellitus (H)             Past Surgical History:     Past Surgical History:   Procedure Laterality Date      APPENDECTOMY       TRANSURETHERAL DESTRUCTION OF PROSTATE BY THERMOTHERAPY N/A 8/22/2019    Procedure: Thermotherapy of Prostate Using Conductive Water Vapor (rezum procedure);  Surgeon: Emmett Coronado MD;  Location: [facility]            Medications     Current Outpatient Medications   Medication     aspirin 81 MG EC tablet     gabapentin (NEURONTIN) 100 MG capsule     lisinopril (ZESTRIL) 10 MG tablet     metFORMIN (GLUCOPHAGE) 500 MG tablet     multivitamin (ONE-DAILY) tablet     oxybutynin (DITROPAN) 5 MG tablet     tamsulosin (FLOMAX) 0.4 MG 24 hr capsule     tamsulosin (FLOMAX) 0.4 MG capsule     No current facility-administered medications for this visit.             Family History:   No family history on file.         Social History:     Social History     Socioeconomic History     Marital status:      Spouse name: Not on file     Number of children: Not on file     Years of education: Not on file     Highest education level: Not on file   Occupational History     Not on file   Social Needs     Financial resource strain: Not on file     Food insecurity     Worry: Not on file     Inability: Not on file     Transportation needs     Medical: Not on file     Non-medical: Not on file   Tobacco Use     Smoking status: Never Smoker     Smokeless tobacco: Never Used   Substance and Sexual Activity     Alcohol use: Not Currently     Drug use: Never     Sexual activity: Yes     Partners: Female   Lifestyle     Physical activity     Days per week: Not on file     Minutes per session: Not on file     Stress: Not on file   Relationships     Social connections     Talks on phone: Not on file     Gets together: Not on file     Attends Yarsanism service: Not on file     Active member of club or organization: Not on file     Attends meetings of clubs or organizations: Not on file     Relationship status: Not on file     Intimate partner violence     Fear of current or ex partner: Not on file     Emotionally abused:  Not on file     Physically abused: Not on file     Forced sexual activity: Not on file   Other Topics Concern     Parent/sibling w/ CABG, MI or angioplasty before 65F 55M? Not Asked   Social History Narrative     Not on file            Allergies:   Pork allergy         Review of Systems:  From intake questionnaire   Negative 14 system review except as noted on HPI, nurse's note.        CC:  [facility]

## 2020-06-10 LAB
BACTERIA SPEC CULT: NORMAL
Lab: NORMAL
SPECIMEN SOURCE: NORMAL

## 2020-07-02 ENCOUNTER — MEDICAL CORRESPONDENCE (OUTPATIENT)
Dept: HEALTH INFORMATION MANAGEMENT | Facility: CLINIC | Age: 80
End: 2020-07-02

## 2020-07-16 ENCOUNTER — DOCUMENTATION ONLY (OUTPATIENT)
Dept: CARE COORDINATION | Facility: CLINIC | Age: 80
End: 2020-07-16

## 2020-08-27 ENCOUNTER — TRANSCRIBE ORDERS (OUTPATIENT)
Dept: OTHER | Age: 80
End: 2020-08-27

## 2020-08-27 DIAGNOSIS — H91.90 UNILATERAL HEARING LOSS: Primary | ICD-10-CM

## 2020-09-04 ENCOUNTER — OFFICE VISIT (OUTPATIENT)
Dept: AUDIOLOGY | Facility: CLINIC | Age: 80
End: 2020-09-04
Payer: MEDICARE

## 2020-09-04 DIAGNOSIS — H91.90 UNILATERAL HEARING LOSS: ICD-10-CM

## 2020-09-04 DIAGNOSIS — H90.3 SENSORINEURAL HEARING LOSS (SNHL) OF BOTH EARS: Primary | ICD-10-CM

## 2020-09-04 NOTE — PROGRESS NOTES
AUDIOLOGY REPORT    SUBJECTIVE:  Brian Mehta is a 80 year old male who was seen in the Audiology Clinic at the [facility] for audiologic evaluation, referred by Huy Middleton M.D. The patient is accompanied to today's appointment by his son who assists with case history. Patient has a history of memory concerns. Patient's son reports his father has not been hearing well; as indicated by him needing to turn up the volume on the TV to a very high level and have people talk to him in a very loud voice in order for him to understand. The patient denies ear pain, aural fullness, tinnitus and drainage. Patient's son also reports imbalance and dizziness upon transitioning from a seated to standing position quickly. Patient and his son deny any falls to date. Patient's son reports that his father has been able to caught himself and steady himself to a point that they are not currently concerned about him falling.    OBJECTIVE:  Fall Risk Screen:  1. Have you fallen two or more times in the past year? No  2. Have you fallen and had an injury in the past year? No    Otoscopic exam indicates ears are clear of cerumen bilaterally     Pure Tone Thresholds assessed using conventional audiometry with good  reliability from 250-8000 Hz bilaterally using insert earphones and circumaural headphones     RIGHT:  normal sloping to moderate-severe sensorineural hearing loss    LEFT:    normal sloping to moderate-severe sensorineural hearing loss    Tympanogram:    RIGHT: normal eardrum mobility    LEFT:   normal eardrum mobility    Reflexes (reported by stimulus ear):  RIGHT: Ipsilateral is present at normal levels  RIGHT: Contralateral is present at normal levels  LEFT:   Ipsilateral is present at normal levels  LEFT:   Contralateral is present at normal levels      Speech Reception Threshold:    RIGHT: 40 dB HL    LEFT:   30 dB HL  Word Recognition Score:     RIGHT: 92% at 75 dB HL using NU-6 recorded  word list.    LEFT:   84% at 75 dB HL using NU-6 recorded word list.      ASSESSMENT:   Normal sloping to moderately-severe sensorineural hearing loss, bilaterally.     PLAN:  Patient was counseled regarding hearing loss and impact on communication. Patient is a good candidate for amplification at this time. It is recommended that the patient return for hearing aid consult if desired. It is recommended the patient return for re-evaluation of hearing every 2-3 years or sooner should concerns arise. Please call this clinic with questions regarding these results or recommendations.      Noman Rinaldi. CCC-A  Licensed Audiologist   [place] #[id number]

## 2020-09-09 ENCOUNTER — OFFICE VISIT (OUTPATIENT)
Dept: AUDIOLOGY | Facility: CLINIC | Age: 80
End: 2020-09-09
Payer: MEDICARE

## 2020-09-09 DIAGNOSIS — H90.3 SENSORINEURAL HEARING LOSS (SNHL) OF BOTH EARS: Primary | ICD-10-CM

## 2020-09-09 NOTE — PROGRESS NOTES
AUDIOLOGY REPORT    SUBJECTIVE: Brian Mehta is a 80 year old male was seen in the Audiology Clinic at  [facility] on 9/09/20 to discuss concerns with hearing and functional communication difficulties. The patient was accompanied by their nephew. Brian has been seen previously on 09/04/2020, and results revealed a normal sloping to moderately severe sensorineural hearing loss in both ears. Brian notes difficulty with communication in a variety of listening situations. He is interested in rechargeable option.    OBJECTIVE:  Patient is a hearing aid candidate. Patient would like to move forward with a hearing aid evaluation today. Therefore, the patient was presented with different options for amplification to help aid in communication. Discussed styles, levels of technology and monaural vs. binaural fitting. He does get wax build up however his nephew states there is a nurse and lots of family support to help with the maintenance of the hearing aids.    The hearing aid(s) mutually chosen were:  Binaural: Phonak Audeo M70-R  COLOR: P7 Graphite  BATTERY SIZE: Rechargeable   EARMOLD/TIPS: Dome   CANAL/ LENGTH: 2    ASSESSMENT:   Reviewed purchase information and warranty information with patient. The 45 day trial period was explained to patient. The patient was given a copy of the [place] Department [employer] consumer brochure on purchasing hearing instruments. Patient risk factors have been provided to the patient in writing prior to the sale of the hearing aid per FDA regulation. The risk factors are also available in the User Instructional Booklet to be presented on the day of the hearing aid fitting. Hearing aid(s) ordered. Hearing aid evaluation completed.    PLAN: Brian is scheduled to return in 2-3 weeks for a hearing aid fitting and programming. Purchase agreement will be completed on that date. Please contact this clinic with any questions or concerns.      Belkis  MISAEL Skelton.   Audiology Doctoral Extern, License #[id number]    I was present with the patient for the entire Audiology appointment including all procedures/testing performed by the AuD student, and agree with the student s assessment and plan as documented.      Victor M Earl, [facility]  Licensed Audiologist  [place] #[id number]

## 2020-09-14 ENCOUNTER — PRE VISIT (OUTPATIENT)
Dept: UROLOGY | Facility: CLINIC | Age: 80
End: 2020-09-14

## 2020-09-15 ENCOUNTER — VIRTUAL VISIT (OUTPATIENT)
Dept: UROLOGY | Facility: CLINIC | Age: 80
End: 2020-09-15
Payer: MEDICARE

## 2020-09-15 DIAGNOSIS — N40.1 BENIGN LOCALIZED PROSTATIC HYPERPLASIA WITH LOWER URINARY TRACT SYMPTOMS (LUTS): Primary | ICD-10-CM

## 2020-09-15 RX ORDER — FINASTERIDE 5 MG/1
TABLET, FILM COATED ORAL
COMMUNITY
Start: 2020-06-24

## 2020-09-15 NOTE — NURSING NOTE
Chief Complaint   Patient presents with     RECHECK     BPH/LUTS follow up     Chloe Galdamez, CMA

## 2020-09-15 NOTE — PROGRESS NOTES
Brian Mehta is a 80 year old male who is being evaluated via a billable telephone visit.  Meeting today supplemented with information from his relative Yung.          UROLOGY TELEPHONE FOLLOW-UP NOTE           Chief Complaint:   LUTS         Interval Update    Brian Mehta is a very pleasant 81 yo M    Brief  History: Underwent REZUM with me one year ago.  Initially had response but of late he is having progressive frequency and nocturia.  Had a UTI at last visit in person, treated without relief of symptoms.  Has tried finasteride, flomax, ditropan.  Seems uncertain if he is taking all three as directed.    Today notes: No hematuria, dysuria improved, but frequency, slow stream, nocturia have not.        Labs and Pathology:    I personally reviewed all applicable laboratory data and went over findings with patient  Significant for:    CBC RESULTS:  Recent Labs   Lab Test 07/11/19  1553   WBC 6.3   HGB 14.4           BMP RESULTS:  Recent Labs   Lab Test 07/11/19  1553      POTASSIUM 4.0   CHLORIDE 104   CO2 31   ANIONGAP 3   *   BUN 20   CR 1.07   GFRESTIMATED 66   GFRESTBLACK 77   JULIOCESAR 8.4*       UA RESULTS:   Recent Labs   Lab Test 06/09/20  1220 08/20/19  1444 07/11/19  1601   SG 1.015 1.018 1.019   URINEPH 5.0 5.0 5.0   NITRITE Positive* Negative Negative   RBCU 11* 2 3*   WBCU >182* 1 19*       PSA RESULTS  PSA   Date Value Ref Range Status   01/18/2016 1.37 0 - 4 ug/L Final   11/11/2005 1.31 0 - 4 ug/L Final                Assessment/Plan   80 year old male with BPH/LUTS s/p Rezum still symptomatic  -Will have patient return for cysto to further assess for stricture, residual adenoma, alterative explanation for symptom               Past Medical History:     Past Medical History:   Diagnosis Date     Diabetes mellitus (H)             Past Surgical History:     Past Surgical History:   Procedure Laterality Date     APPENDECTOMY       TRANSURETHERAL DESTRUCTION OF PROSTATE BY  THERMOTHERAPY N/A 8/22/2019    Procedure: Thermotherapy of Prostate Using Conductive Water Vapor (rezum procedure);  Surgeon: Emmett Coronado MD;  Location: [facility] OR            Medications     Current Outpatient Medications   Medication     aspirin 81 MG EC tablet     finasteride (PROSCAR) 5 MG tablet     gabapentin (NEURONTIN) 100 MG capsule     lisinopril (ZESTRIL) 10 MG tablet     metFORMIN (GLUCOPHAGE) 500 MG tablet     multivitamin (ONE-DAILY) tablet     oxybutynin (DITROPAN) 5 MG tablet     tamsulosin (FLOMAX) 0.4 MG 24 hr capsule     No current facility-administered medications for this visit.             Family History:   No family history on file.         Social History:     Social History     Socioeconomic History     Marital status:      Spouse name: Not on file     Number of children: Not on file     Years of education: Not on file     Highest education level: Not on file   Occupational History     Not on file   Social Needs     Financial resource strain: Not on file     Food insecurity     Worry: Not on file     Inability: Not on file     Transportation needs     Medical: Not on file     Non-medical: Not on file   Tobacco Use     Smoking status: Never Smoker     Smokeless tobacco: Never Used   Substance and Sexual Activity     Alcohol use: Not Currently     Drug use: Never     Sexual activity: Yes     Partners: Female   Lifestyle     Physical activity     Days per week: Not on file     Minutes per session: Not on file     Stress: Not on file   Relationships     Social connections     Talks on phone: Not on file     Gets together: Not on file     Attends Faith service: Not on file     Active member of club or organization: Not on file     Attends meetings of clubs or organizations: Not on file     Relationship status: Not on file     Intimate partner violence     Fear of current or ex partner: Not on file     Emotionally abused: Not on file     Physically abused: Not on file     Forced  "sexual activity: Not on file   Other Topics Concern     Parent/sibling w/ CABG, MI or angioplasty before 65F 55M? Not Asked   Social History Narrative     Not on file            Allergies:   Pork allergy         Review of Systems:  From intake questionnaire   Negative 14 system review except as noted on HPI, nurse's note.        CC:  [facility]        The patient has been notified of following:     \"This telephone visit will be conducted via a call between you and your physician/provider. We have found that certain health care needs can be provided without the need for a physical exam.  This service lets us provide the care you need with a short phone conversation.  If a prescription is necessary we can send it directly to your pharmacy.  If lab work is needed we can place an order for that and you can then stop by our lab to have the test done at a later time.    Telephone visits are billed at different rates depending on your insurance coverage. During this emergency period, for some insurers they may be billed the same as an in-person visit.  Please reach out to your insurance provider with any questions.    If during the course of the call the physician/provider feels a telephone visit is not appropriate, you will not be charged for this service.\"    Patient has given verbal consent for Telephone visit?  Yes    What phone number would you like to be contacted at? [phone]    How would you like to obtain your AVS? Mail a copy    Phone call duration: 8 minutes    Emmett Coronado MD      "

## 2020-09-15 NOTE — LETTER
9/15/2020       RE: Brian Mehta  [address]  [facility] [address]     Dear Colleague,    Thank you for referring your patient, Brian Mehta, to the [facility] UROLOGY AND INST FOR PROSTATE AND UROLOGIC CANCERS at [facility]. Please see a copy of my visit note below.    Brian Mehta is a 80 year old male who is being evaluated via a billable telephone visit.  Meeting today supplemented with information from his relative Yung.          UROLOGY TELEPHONE FOLLOW-UP NOTE           Chief Complaint:   LUTS         Interval Update    Brian Mehta is a very pleasant 79 yo M    Brief  History: Underwent REZUM with me one year ago.  Initially had response but of late he is having progressive frequency and nocturia.  Had a UTI at last visit in person, treated without relief of symptoms.  Has tried finasteride, flomax, ditropan.  Seems uncertain if he is taking all three as directed.    Today notes: No hematuria, dysuria improved, but frequency, slow stream, nocturia have not.        Labs and Pathology:    I personally reviewed all applicable laboratory data and went over findings with patient  Significant for:    CBC RESULTS:  Recent Labs   Lab Test 07/11/19  1553   WBC 6.3   HGB 14.4           BMP RESULTS:  Recent Labs   Lab Test 07/11/19  1553      POTASSIUM 4.0   CHLORIDE 104   CO2 31   ANIONGAP 3   *   BUN 20   CR 1.07   GFRESTIMATED 66   GFRESTBLACK 77   JULIOCESAR 8.4*       UA RESULTS:   Recent Labs   Lab Test 06/09/20  1220 08/20/19  1444 07/11/19  1601   SG 1.015 1.018 1.019   URINEPH 5.0 5.0 5.0   NITRITE Positive* Negative Negative   RBCU 11* 2 3*   WBCU >182* 1 19*       PSA RESULTS  PSA   Date Value Ref Range Status   01/18/2016 1.37 0 - 4 ug/L Final   11/11/2005 1.31 0 - 4 ug/L Final                Assessment/Plan   80 year old male with BPH/LUTS s/p Rezum still symptomatic  -Will have patient return for cysto to further assess for stricture,  residual adenoma, alterative explanation for symptom               Past Medical History:     Past Medical History:   Diagnosis Date     Diabetes mellitus (H)             Past Surgical History:     Past Surgical History:   Procedure Laterality Date     APPENDECTOMY       TRANSURETHERAL DESTRUCTION OF PROSTATE BY THERMOTHERAPY N/A 8/22/2019    Procedure: Thermotherapy of Prostate Using Conductive Water Vapor (rezum procedure);  Surgeon: Emmett Coronado MD;  Location:  OR            Medications     Current Outpatient Medications   Medication     aspirin 81 MG EC tablet     finasteride (PROSCAR) 5 MG tablet     gabapentin (NEURONTIN) 100 MG capsule     lisinopril (ZESTRIL) 10 MG tablet     metFORMIN (GLUCOPHAGE) 500 MG tablet     multivitamin (ONE-DAILY) tablet     oxybutynin (DITROPAN) 5 MG tablet     tamsulosin (FLOMAX) 0.4 MG 24 hr capsule     No current facility-administered medications for this visit.             Family History:   No family history on file.         Social History:     Social History     Socioeconomic History     Marital status:      Spouse name: Not on file     Number of children: Not on file     Years of education: Not on file     Highest education level: Not on file   Occupational History     Not on file   Social Needs     Financial resource strain: Not on file     Food insecurity     Worry: Not on file     Inability: Not on file     Transportation needs     Medical: Not on file     Non-medical: Not on file   Tobacco Use     Smoking status: Never Smoker     Smokeless tobacco: Never Used   Substance and Sexual Activity     Alcohol use: Not Currently     Drug use: Never     Sexual activity: Yes     Partners: Female   Lifestyle     Physical activity     Days per week: Not on file     Minutes per session: Not on file     Stress: Not on file   Relationships     Social connections     Talks on phone: Not on file     Gets together: Not on file     Attends Yarsanism service: Not on file      "Active member of club or organization: Not on file     Attends meetings of clubs or organizations: Not on file     Relationship status: Not on file     Intimate partner violence     Fear of current or ex partner: Not on file     Emotionally abused: Not on file     Physically abused: Not on file     Forced sexual activity: Not on file   Other Topics Concern     Parent/sibling w/ CABG, MI or angioplasty before 65F 55M? Not Asked   Social History Narrative     Not on file            Allergies:   Pork allergy         Review of Systems:  From intake questionnaire   Negative 14 system review except as noted on HPI, nurse's note.        CC:  [facility]        The patient has been notified of following:     \"This telephone visit will be conducted via a call between you and your physician/provider. We have found that certain health care needs can be provided without the need for a physical exam.  This service lets us provide the care you need with a short phone conversation.  If a prescription is necessary we can send it directly to your pharmacy.  If lab work is needed we can place an order for that and you can then stop by our lab to have the test done at a later time.    Telephone visits are billed at different rates depending on your insurance coverage. During this emergency period, for some insurers they may be billed the same as an in-person visit.  Please reach out to your insurance provider with any questions.    If during the course of the call the physician/provider feels a telephone visit is not appropriate, you will not be charged for this service.\"    Patient has given verbal consent for Telephone visit?  Yes    What phone number would you like to be contacted at? [phone]    How would you like to obtain your AVS? Mail a copy    Phone call duration: 8 minutes    Emmett Coronado MD      "

## 2020-09-18 NOTE — PATIENT INSTRUCTIONS
Please schedule cystoscopy next available to assess lower urinary tract symptoms.    It was a pleasure meeting with you today.  Thank you for allowing me and my team the privilege of caring for you today.  YOU are the reason we are here, and I truly hope we provided you with the excellent service you deserve.  Please let us know if there is anything else we can do for you so that we can be sure you are leaving completely satisfied with your care experience.      Chloe Galdamez, CMA

## 2020-09-22 ENCOUNTER — TELEPHONE (OUTPATIENT)
Dept: UROLOGY | Facility: CLINIC | Age: 80
End: 2020-09-22

## 2020-10-07 ENCOUNTER — TELEPHONE (OUTPATIENT)
Dept: AUDIOLOGY | Facility: CLINIC | Age: 80
End: 2020-10-07

## 2020-10-07 ENCOUNTER — OFFICE VISIT (OUTPATIENT)
Dept: AUDIOLOGY | Facility: CLINIC | Age: 80
End: 2020-10-07
Payer: COMMERCIAL

## 2020-10-07 DIAGNOSIS — H90.3 SENSORINEURAL HEARING LOSS (SNHL) OF BOTH EARS: Primary | ICD-10-CM

## 2020-10-07 PROCEDURE — V5020 CONFORMITY EVALUATION: HCPCS | Performed by: AUDIOLOGIST

## 2020-10-07 PROCEDURE — V5261 HEARING AID, DIGIT, BIN, BTE: HCPCS | Mod: NU | Performed by: AUDIOLOGIST

## 2020-10-07 PROCEDURE — V5160 DISPENSING FEE BINAURAL: HCPCS | Performed by: AUDIOLOGIST

## 2020-10-07 NOTE — TELEPHONE ENCOUNTER
LVM letting pt know provider will not be in clinic today for 2:30pm appt. Clinic was able to reschedule with ronan provider at 2:00pm today if pt can make appt. If not, pt will need to reschedule HFP and IRP. Left call center number for rescheduling.

## 2020-10-07 NOTE — PROGRESS NOTES
AUDIOLOGY REPORT    SUBJECTIVE: Brian Mehta is a 80 year old male who was seen in the Audiology Clinic at the [facility] for a fitting of binaural Phonak Audeo M70-R hearing aids. Previous results have revealed a bilateral sensorineural hearing loss. The patient was given medical clearance to pursue amplification by  Huy Middleton MD.. He was accompanied to today's appointment by a family member.    OBJECTIVE: The hearing aid conformity evaluation was completed.The hearing aids were placed and they provided a good fit. In an effort to minimize close contact during the Covid-19 pandemic, simulated real-ear-probe-microphone measurements were completed on the ITYZ system and were a good match to NAL-NL1 target with soft sounds audible, moderate sounds comfortable, and loud sounds below discomfort. UCLs are verified through maximum power output measures and demonstrate appropriate limiting of loud inputs. Even with adjustments, target gain at 250 Hz could not be reached.  Brian was oriented to proper hearing aid use, care, cleaning (no water, dry brush), batteries (rechargeable, insertion/removal, toxicity, low-battery signal), aid insertion/removal, user booklet, warranty information, storage cases, and other hearing aid details. The patient confirmed understanding of hearing aid use and care, and showed proper insertion of hearing aid and batteries while in the office today. He reports that things were a little too loud initially, the hearing aids were set to 80% and will auto-acclimatize to 100% over 30 days. Brian reported good volume and sound quality today.   Hearing aids were programmed as follows:  Program 1:AutoSense  Toggle is deactivated.    EAR(S) FIT: Bilateral  HEARING AID MODEL NAME: Phonak Audeo M70-R  HEARING AID STYLE: -in-the-ear behind-the-ear  EARMOLDS/TIP: medium open  SERIAL NUMBERS: Right: [id number] Left:: [id number]  WARRANTY END DATE:  12/15/2023    The patient does not have a cell phone and so nothing was paired today.    ASSESSMENT: Bilateral hearing aids were fit today. Verification measures were performed. Brian signed the Hearing Aid Purchase Agreement and was given a copy, as well as details on his hearing aids. Patient was counseled that exact out of pocket amounts cannot be determined for hearing aid claims being sent to insurance. Any insurance coverage information presented to the patient is an estimate only, and is not a guarantee of payment. Patient has been advised to check with their own insurance.    PLAN:Brian will return for follow-up in 2-3 weeks for a hearing aid review appointment.  Hearing aids billed according to [place]/[facility] guidelines. Please call this clinic with questions regarding today s appointment.    Agata Merino  Audiologist  [place] License  #[id number]

## 2021-07-23 NOTE — TELEPHONE ENCOUNTER
Reason for Visit: TOV    Diagnosis: Urinary urgency    Orders/Procedures/Records: Records available    Contact Patient: N/A    Rooming Requirements: JESSICA Thompson  09/03/19  7:17 AM       GEN: Denies fever, chills, sick contacts, recent travel  HEENT: Denies dizziness, blurry vision, HA  Cardiac: Denies CP, SOB, palpitations  Lungs: Denies cough, wheezing  PV: Denies LE swelling  GI: Denies nausea, vomiting, diarrhea, constipation, flank pain  : Denies hematuria, dysuria, frequency, urgency  Skin: +rash Denies redness, open wound  MSK: Denies pain, decreased ROM  Neuro: Denies dizziness, difficulty speaking, difficulty swallowing, numbness/tingling in extremities, loss of bowel/bladder control, weakness in extremities

## 2022-01-24 ENCOUNTER — LAB REQUISITION (OUTPATIENT)
Dept: LAB | Facility: CLINIC | Age: 82
End: 2022-01-24
Payer: MEDICARE

## 2022-01-24 DIAGNOSIS — R53.1 WEAKNESS: ICD-10-CM

## 2022-01-24 LAB
ALBUMIN SERPL-MCNC: 2.5 G/DL (ref 3.4–5)
ALP SERPL-CCNC: 55 U/L (ref 40–150)
ALT SERPL W P-5'-P-CCNC: 27 U/L (ref 0–70)
ANION GAP SERPL CALCULATED.3IONS-SCNC: 8 MMOL/L (ref 3–14)
AST SERPL W P-5'-P-CCNC: 17 U/L (ref 0–45)
BILIRUB SERPL-MCNC: 0.8 MG/DL (ref 0.2–1.3)
BUN SERPL-MCNC: 14 MG/DL (ref 7–30)
CALCIUM SERPL-MCNC: 8.5 MG/DL (ref 8.5–10.1)
CHLORIDE BLD-SCNC: 102 MMOL/L (ref 94–109)
CK SERPL-CCNC: 66 U/L (ref 30–300)
CO2 SERPL-SCNC: 27 MMOL/L (ref 20–32)
CREAT SERPL-MCNC: 1.18 MG/DL (ref 0.66–1.25)
GFR SERPL CREATININE-BSD FRML MDRD: 63 ML/MIN/1.73M2
GLUCOSE BLD-MCNC: 125 MG/DL (ref 70–99)
POTASSIUM BLD-SCNC: 3.2 MMOL/L (ref 3.4–5.3)
PROT SERPL-MCNC: 6.6 G/DL (ref 6.8–8.8)
SODIUM SERPL-SCNC: 137 MMOL/L (ref 133–144)
TSH SERPL DL<=0.005 MIU/L-ACNC: 1.28 MU/L (ref 0.4–4)
VIT B12 SERPL-MCNC: 291 PG/ML (ref 193–986)

## 2022-01-24 PROCEDURE — 82607 VITAMIN B-12: CPT | Mod: ORL | Performed by: INTERNAL MEDICINE

## 2022-01-24 PROCEDURE — 84443 ASSAY THYROID STIM HORMONE: CPT | Mod: ORL | Performed by: INTERNAL MEDICINE

## 2022-01-24 PROCEDURE — 82550 ASSAY OF CK (CPK): CPT | Mod: ORL | Performed by: INTERNAL MEDICINE

## 2022-01-24 PROCEDURE — 80053 COMPREHEN METABOLIC PANEL: CPT | Mod: ORL | Performed by: INTERNAL MEDICINE

## 2022-12-06 NOTE — TELEPHONE ENCOUNTER
Spoke with Yung relative about more cipro unless he shows symptoms of UTI we will hold off on refilling cipro but he may need oxybutynin refilled to help with the sherwood this week end.  Refilled thru pharmacy in our clinic setting.  Appointment made for next Tuesday to talk to domingo regarding failed TOV's Lynsey Lopez LPN Staff Nurse     Retention Suture Bite Size: 3 mm

## 2023-01-30 ENCOUNTER — LAB REQUISITION (OUTPATIENT)
Dept: LAB | Facility: CLINIC | Age: 83
End: 2023-01-30
Payer: MEDICARE

## 2023-01-30 DIAGNOSIS — E11.9 TYPE 2 DIABETES MELLITUS WITHOUT COMPLICATIONS (H): ICD-10-CM

## 2023-01-30 LAB
ALBUMIN SERPL BCG-MCNC: 3.7 G/DL (ref 3.5–5.2)
ALP SERPL-CCNC: 63 U/L (ref 40–129)
ALT SERPL W P-5'-P-CCNC: 18 U/L (ref 10–50)
ANION GAP SERPL CALCULATED.3IONS-SCNC: 12 MMOL/L (ref 7–15)
AST SERPL W P-5'-P-CCNC: 16 U/L (ref 10–50)
BILIRUB SERPL-MCNC: 0.7 MG/DL
BUN SERPL-MCNC: 20.6 MG/DL (ref 8–23)
CALCIUM SERPL-MCNC: 8.9 MG/DL (ref 8.8–10.2)
CHLORIDE SERPL-SCNC: 101 MMOL/L (ref 98–107)
CREAT SERPL-MCNC: 1.18 MG/DL (ref 0.67–1.17)
DEPRECATED HCO3 PLAS-SCNC: 25 MMOL/L (ref 22–29)
GFR SERPL CREATININE-BSD FRML MDRD: 62 ML/MIN/1.73M2
GLUCOSE SERPL-MCNC: 146 MG/DL (ref 70–99)
POTASSIUM SERPL-SCNC: 3.9 MMOL/L (ref 3.4–5.3)
PROT SERPL-MCNC: 6.4 G/DL (ref 6.4–8.3)
SODIUM SERPL-SCNC: 138 MMOL/L (ref 136–145)

## 2023-01-30 PROCEDURE — 80053 COMPREHEN METABOLIC PANEL: CPT | Mod: ORL | Performed by: INTERNAL MEDICINE

## 2023-06-27 ENCOUNTER — LAB REQUISITION (OUTPATIENT)
Dept: LAB | Facility: CLINIC | Age: 83
End: 2023-06-27
Payer: MEDICARE

## 2023-06-27 DIAGNOSIS — E11.9 TYPE 2 DIABETES MELLITUS WITHOUT COMPLICATIONS (H): ICD-10-CM

## 2023-07-27 ENCOUNTER — TRANSCRIBE ORDERS (OUTPATIENT)
Dept: OTHER | Age: 83
End: 2023-07-27

## 2023-07-27 DIAGNOSIS — Z79.4 LONG TERM CURRENT USE OF INSULIN (H): ICD-10-CM

## 2023-07-27 DIAGNOSIS — E11.9 CONTROLLED DIABETES MELLITUS TYPE II WITHOUT COMPLICATION (H): Primary | ICD-10-CM

## 2023-08-03 NOTE — TELEPHONE ENCOUNTER
DIAGNOSIS: Both Foot fungus - Controlled diabetes mellitus type II without complication   APPOINTMENT DATE: 09/15/2023   NOTES STATUS DETAILS   OFFICE NOTE from referring provider Care Everywhere 07/25/2023 -- Keya Kent MD @ [facility]   OFFICE NOTE from other specialist Care Everywhere 02/11/2022 -- Shari Muñoz APRN, CNP  @ [facility]   MEDICATION LIST Internal

## 2023-09-15 ENCOUNTER — PRE VISIT (OUTPATIENT)
Dept: ORTHOPEDICS | Facility: CLINIC | Age: 83
End: 2023-09-15

## 2023-12-08 NOTE — TELEPHONE ENCOUNTER
Action 2023 JTV 10:16 AM    Action Taken NIDHI sent an urgent request to [facility] for images.     Action 2023 JTV 11:46 AM    Action Taken NIDHI received and resolved images from [facility].   MEDICAL RECORDS REQUEST   [facility]  Urology Clinic  [address] [place] [address]  PHONE: [phone]  Fax: [phone]        FUTURE VISIT INFORMATION                                                   Brian Mehta, : 1940 scheduled for future visit at [facility]    APPOINTMENT INFORMATION:  Date: 2024  Provider:  Emmett Coronado MD  Reason for Visit/Diagnosis: enlarged prostate      RECORDS REQUESTED FOR VISIT                                                     NOTES  STATUS/DETAILS   OFFICE NOTE from other specialist  yes, , 2023 -- Keya Kent MD @ [facility]    2023 -- Rick Mcelroy MD @ [facility]    09/15/2020 -- Emmett Coronado MD     MORE   MEDICATION LIST  yes   LABS     URINALYSIS (UA)  yes   IMAGES  yes, 01/10/2020 -- CT CHEST ABD AORTIC     PRE-VISIT CHECKLIST      Joint diagnostic appointment coordinated correctly          (ensure right order & amount of time) Yes   RECORD COLLECTION COMPLETE yes

## 2024-01-05 ENCOUNTER — PRE VISIT (OUTPATIENT)
Dept: UROLOGY | Facility: CLINIC | Age: 84
End: 2024-01-05
Payer: MEDICARE

## 2024-01-05 NOTE — TELEPHONE ENCOUNTER
Reason for visit:   BPH Consult, LUTS     Relevant information:   S/p Rezum procedure 8/2019  LOV 9/15/2020  POC Return for cysto to determine cause of returned symptoms    Records/imaging/labs/orders:   N/a    Pt called: No need for a call    At Rooming: AUA, urine sample, PVR    An Liu LPN  1/5/2024  8:45 AM

## 2024-01-09 ENCOUNTER — PRE VISIT (OUTPATIENT)
Dept: UROLOGY | Facility: CLINIC | Age: 84
End: 2024-01-09

## 2025-08-27 ENCOUNTER — LAB REQUISITION (OUTPATIENT)
Dept: LAB | Facility: CLINIC | Age: 85
End: 2025-08-27
Payer: MEDICARE

## 2025-08-27 DIAGNOSIS — E11.9 TYPE 2 DIABETES MELLITUS WITHOUT COMPLICATIONS (H): ICD-10-CM

## 2025-08-27 DIAGNOSIS — R42 DIZZINESS AND GIDDINESS: ICD-10-CM

## 2025-08-28 LAB
ALBUMIN SERPL BCG-MCNC: 3.8 G/DL (ref 3.5–5.2)
ALP SERPL-CCNC: 62 U/L (ref 40–150)
ALT SERPL W P-5'-P-CCNC: 14 U/L (ref 0–70)
ANION GAP SERPL CALCULATED.3IONS-SCNC: 11 MMOL/L (ref 7–15)
AST SERPL W P-5'-P-CCNC: 18 U/L (ref 0–45)
BILIRUB SERPL-MCNC: 1.2 MG/DL
BUN SERPL-MCNC: 17.1 MG/DL (ref 8–23)
CALCIUM SERPL-MCNC: 9.2 MG/DL (ref 8.8–10.4)
CHLORIDE SERPL-SCNC: 101 MMOL/L (ref 98–107)
CHOLEST SERPL-MCNC: 178 MG/DL
CREAT SERPL-MCNC: 1.04 MG/DL (ref 0.67–1.17)
EGFRCR SERPLBLD CKD-EPI 2021: 71 ML/MIN/1.73M2
FASTING STATUS PATIENT QL REPORTED: NO
FASTING STATUS PATIENT QL REPORTED: NO
GLUCOSE SERPL-MCNC: 114 MG/DL (ref 70–99)
HCO3 SERPL-SCNC: 27 MMOL/L (ref 22–29)
HDLC SERPL-MCNC: 38 MG/DL
LDLC SERPL CALC-MCNC: 123 MG/DL
NONHDLC SERPL-MCNC: 140 MG/DL
POTASSIUM SERPL-SCNC: 3.8 MMOL/L (ref 3.4–5.3)
PROT SERPL-MCNC: 6.5 G/DL (ref 6.4–8.3)
SODIUM SERPL-SCNC: 139 MMOL/L (ref 135–145)
TRIGL SERPL-MCNC: 86 MG/DL

## (undated) DEVICE — SOL WATER IRRIG 1000ML BOTTLE 2F7114

## (undated) DEVICE — STRAP KNEE/BODY 31143004

## (undated) DEVICE — Device

## (undated) DEVICE — GLOVE PROTEXIS W/NEU-THERA 7.5  2D73TE75

## (undated) DEVICE — GOWN IMPERVIOUS SPECIALTY XLG/XLONG 32474

## (undated) DEVICE — SUCTION MANIFOLD DORNOCH ULTRA CART UL-CL500

## (undated) DEVICE — CATH FOLEY 18FR 5ML SILVER COAT SIL LUBRISIL 1758SI18

## (undated) DEVICE — LINEN GOWN X4 5410

## (undated) DEVICE — KIT REZUM DELIVERY DEVICE D2201-003

## (undated) DEVICE — PAD CHUX UNDERPAD 30X36" P3036C

## (undated) DEVICE — LINEN TOWEL PACK X5 5464

## (undated) DEVICE — SOL WATER IRRIG 3000ML BAG 2B7117

## (undated) RX ORDER — ONDANSETRON 2 MG/ML
INJECTION INTRAMUSCULAR; INTRAVENOUS
Status: DISPENSED
Start: 2019-08-22

## (undated) RX ORDER — CIPROFLOXACIN 500 MG/1
TABLET, FILM COATED ORAL
Status: DISPENSED
Start: 2019-08-27

## (undated) RX ORDER — LIDOCAINE HYDROCHLORIDE 20 MG/ML
JELLY TOPICAL
Status: DISPENSED
Start: 2019-08-27

## (undated) RX ORDER — LEVOFLOXACIN 5 MG/ML
INJECTION, SOLUTION INTRAVENOUS
Status: DISPENSED
Start: 2019-08-22

## (undated) RX ORDER — BACITRACIN ZINC 500 [USP'U]/G
OINTMENT TOPICAL
Status: DISPENSED
Start: 2019-08-22

## (undated) RX ORDER — CIPROFLOXACIN 500 MG/1
TABLET, FILM COATED ORAL
Status: DISPENSED
Start: 2019-09-04

## (undated) RX ORDER — LIDOCAINE HYDROCHLORIDE 20 MG/ML
INJECTION, SOLUTION EPIDURAL; INFILTRATION; INTRACAUDAL; PERINEURAL
Status: DISPENSED
Start: 2019-08-22

## (undated) RX ORDER — LIDOCAINE HYDROCHLORIDE 20 MG/ML
JELLY TOPICAL
Status: DISPENSED
Start: 2019-08-22

## (undated) RX ORDER — LIDOCAINE HYDROCHLORIDE 20 MG/ML
JELLY TOPICAL
Status: DISPENSED
Start: 2019-07-09

## (undated) RX ORDER — ACETAMINOPHEN 325 MG/1
TABLET ORAL
Status: DISPENSED
Start: 2019-08-22

## (undated) RX ORDER — FENTANYL CITRATE 50 UG/ML
INJECTION, SOLUTION INTRAMUSCULAR; INTRAVENOUS
Status: DISPENSED
Start: 2019-08-22

## (undated) RX ORDER — LIDOCAINE HYDROCHLORIDE 20 MG/ML
JELLY TOPICAL
Status: DISPENSED
Start: 2019-08-23

## (undated) RX ORDER — PROPOFOL 10 MG/ML
INJECTION, EMULSION INTRAVENOUS
Status: DISPENSED
Start: 2019-08-22